# Patient Record
Sex: FEMALE | Race: WHITE | NOT HISPANIC OR LATINO | Employment: UNEMPLOYED | URBAN - METROPOLITAN AREA
[De-identification: names, ages, dates, MRNs, and addresses within clinical notes are randomized per-mention and may not be internally consistent; named-entity substitution may affect disease eponyms.]

---

## 2017-01-30 ENCOUNTER — TRANSCRIBE ORDERS (OUTPATIENT)
Dept: CARDIOLOGY | Facility: CLINIC | Age: 57
End: 2017-01-30

## 2017-01-30 DIAGNOSIS — R94.39 ABNORMAL STRESS TEST: Primary | ICD-10-CM

## 2017-01-31 DIAGNOSIS — R94.39 ABNORMAL STRESS TEST: Primary | ICD-10-CM

## 2017-01-31 RX ORDER — ASPIRIN 325 MG
325 TABLET ORAL ONCE
Status: CANCELLED | OUTPATIENT
Start: 2017-01-31 | End: 2017-01-31

## 2017-01-31 RX ORDER — ONDANSETRON 2 MG/ML
4 INJECTION INTRAMUSCULAR; INTRAVENOUS EVERY 6 HOURS PRN
Status: CANCELLED | OUTPATIENT
Start: 2017-01-31

## 2017-01-31 RX ORDER — ASPIRIN 81 MG/1
325 TABLET ORAL DAILY
Status: CANCELLED | OUTPATIENT
Start: 2017-02-01

## 2017-01-31 RX ORDER — NITROGLYCERIN 0.4 MG/1
0.4 TABLET SUBLINGUAL
Status: CANCELLED | OUTPATIENT
Start: 2017-01-31

## 2017-01-31 RX ORDER — ACETAMINOPHEN 325 MG/1
650 TABLET ORAL EVERY 4 HOURS PRN
Status: CANCELLED | OUTPATIENT
Start: 2017-01-31

## 2017-01-31 RX ORDER — SODIUM CHLORIDE 0.9 % (FLUSH) 0.9 %
1-10 SYRINGE (ML) INJECTION AS NEEDED
Status: CANCELLED | OUTPATIENT
Start: 2017-01-31

## 2017-02-02 ENCOUNTER — HOSPITAL ENCOUNTER (OUTPATIENT)
Facility: HOSPITAL | Age: 57
Setting detail: HOSPITAL OUTPATIENT SURGERY
Discharge: HOME | End: 2017-02-02
Attending: INTERNAL MEDICINE | Admitting: INTERNAL MEDICINE

## 2017-02-02 VITALS
SYSTOLIC BLOOD PRESSURE: 95 MMHG | DIASTOLIC BLOOD PRESSURE: 61 MMHG | OXYGEN SATURATION: 97 % | RESPIRATION RATE: 18 BRPM | WEIGHT: 99.87 LBS | HEIGHT: 65 IN | TEMPERATURE: 98.7 F | BODY MASS INDEX: 16.64 KG/M2 | HEART RATE: 70 BPM

## 2017-02-02 DIAGNOSIS — R94.39 ABNORMAL STRESS TEST: ICD-10-CM

## 2017-02-02 LAB
ALBUMIN SERPL-MCNC: 4.5 G/DL (ref 3.2–4.8)
ALBUMIN/GLOB SERPL: 1.5 G/DL (ref 1.5–2.5)
ALP SERPL-CCNC: 123 U/L (ref 25–100)
ALT SERPL W P-5'-P-CCNC: 26 U/L (ref 7–40)
ANION GAP SERPL CALCULATED.3IONS-SCNC: 5 MMOL/L (ref 3–11)
ARTICHOKE IGE QN: 160 MG/DL (ref 0–130)
AST SERPL-CCNC: 27 U/L (ref 0–33)
BILIRUB SERPL-MCNC: 0.4 MG/DL (ref 0.3–1.2)
BUN BLD-MCNC: 9 MG/DL (ref 9–23)
BUN/CREAT SERPL: 12.9 (ref 7–25)
CALCIUM SPEC-SCNC: 9.7 MG/DL (ref 8.7–10.4)
CHLORIDE SERPL-SCNC: 104 MMOL/L (ref 99–109)
CHOLEST SERPL-MCNC: 233 MG/DL (ref 0–200)
CO2 SERPL-SCNC: 27 MMOL/L (ref 20–31)
CREAT BLD-MCNC: 0.7 MG/DL (ref 0.6–1.3)
DEPRECATED RDW RBC AUTO: 45.2 FL (ref 37–54)
ERYTHROCYTE [DISTWIDTH] IN BLOOD BY AUTOMATED COUNT: 13.8 % (ref 11.3–14.5)
GFR SERPL CREATININE-BSD FRML MDRD: 87 ML/MIN/1.73
GLOBULIN UR ELPH-MCNC: 3.1 GM/DL
GLUCOSE BLD-MCNC: 79 MG/DL (ref 70–100)
HBA1C MFR BLD: 6 % (ref 4.8–5.6)
HCT VFR BLD AUTO: 40.6 % (ref 34.5–44)
HDLC SERPL-MCNC: 50 MG/DL (ref 40–60)
HGB BLD-MCNC: 14 G/DL (ref 11.5–15.5)
MCH RBC QN AUTO: 30.7 PG (ref 27–31)
MCHC RBC AUTO-ENTMCNC: 34.5 G/DL (ref 32–36)
MCV RBC AUTO: 89 FL (ref 80–99)
PLATELET # BLD AUTO: 284 10*3/MM3 (ref 150–450)
PMV BLD AUTO: 9.7 FL (ref 6–12)
POTASSIUM BLD-SCNC: 3.7 MMOL/L (ref 3.5–5.5)
PROT SERPL-MCNC: 7.6 G/DL (ref 5.7–8.2)
RBC # BLD AUTO: 4.56 10*6/MM3 (ref 3.89–5.14)
SODIUM BLD-SCNC: 136 MMOL/L (ref 132–146)
TRIGL SERPL-MCNC: 131 MG/DL (ref 0–150)
WBC NRBC COR # BLD: 7.28 10*3/MM3 (ref 3.5–10.8)

## 2017-02-02 PROCEDURE — 25010000002 MIDAZOLAM PER 1 MG: Performed by: INTERNAL MEDICINE

## 2017-02-02 PROCEDURE — 80061 LIPID PANEL: CPT | Performed by: NURSE PRACTITIONER

## 2017-02-02 PROCEDURE — 93458 L HRT ARTERY/VENTRICLE ANGIO: CPT | Performed by: INTERNAL MEDICINE

## 2017-02-02 PROCEDURE — C1894 INTRO/SHEATH, NON-LASER: HCPCS | Performed by: INTERNAL MEDICINE

## 2017-02-02 PROCEDURE — 0 IOPAMIDOL PER 1 ML: Performed by: INTERNAL MEDICINE

## 2017-02-02 PROCEDURE — 80053 COMPREHEN METABOLIC PANEL: CPT | Performed by: NURSE PRACTITIONER

## 2017-02-02 PROCEDURE — 36415 COLL VENOUS BLD VENIPUNCTURE: CPT

## 2017-02-02 PROCEDURE — 83036 HEMOGLOBIN GLYCOSYLATED A1C: CPT | Performed by: NURSE PRACTITIONER

## 2017-02-02 PROCEDURE — 25010000002 FENTANYL CITRATE (PF) 100 MCG/2ML SOLUTION: Performed by: INTERNAL MEDICINE

## 2017-02-02 PROCEDURE — C1769 GUIDE WIRE: HCPCS | Performed by: INTERNAL MEDICINE

## 2017-02-02 PROCEDURE — 25010000002 HEPARIN (PORCINE) PER 1000 UNITS: Performed by: INTERNAL MEDICINE

## 2017-02-02 PROCEDURE — S0260 H&P FOR SURGERY: HCPCS | Performed by: INTERNAL MEDICINE

## 2017-02-02 PROCEDURE — 85027 COMPLETE CBC AUTOMATED: CPT | Performed by: NURSE PRACTITIONER

## 2017-02-02 RX ORDER — ACETAMINOPHEN 325 MG/1
650 TABLET ORAL EVERY 4 HOURS PRN
Status: DISCONTINUED | OUTPATIENT
Start: 2017-02-02 | End: 2017-02-02 | Stop reason: HOSPADM

## 2017-02-02 RX ORDER — NITROGLYCERIN 0.4 MG/1
0.4 TABLET SUBLINGUAL
Status: DISCONTINUED | OUTPATIENT
Start: 2017-02-02 | End: 2017-02-02 | Stop reason: HOSPADM

## 2017-02-02 RX ORDER — SODIUM CHLORIDE 9 MG/ML
1-3 INJECTION, SOLUTION INTRAVENOUS CONTINUOUS
Status: DISCONTINUED | OUTPATIENT
Start: 2017-02-02 | End: 2017-02-02 | Stop reason: HOSPADM

## 2017-02-02 RX ORDER — LIDOCAINE HYDROCHLORIDE 10 MG/ML
INJECTION, SOLUTION INFILTRATION; PERINEURAL AS NEEDED
Status: DISCONTINUED | OUTPATIENT
Start: 2017-02-02 | End: 2017-02-02 | Stop reason: HOSPADM

## 2017-02-02 RX ORDER — SODIUM CHLORIDE 0.9 % (FLUSH) 0.9 %
1-10 SYRINGE (ML) INJECTION AS NEEDED
Status: DISCONTINUED | OUTPATIENT
Start: 2017-02-02 | End: 2017-02-02 | Stop reason: HOSPADM

## 2017-02-02 RX ORDER — MIDAZOLAM HYDROCHLORIDE 1 MG/ML
INJECTION INTRAMUSCULAR; INTRAVENOUS AS NEEDED
Status: DISCONTINUED | OUTPATIENT
Start: 2017-02-02 | End: 2017-02-02 | Stop reason: HOSPADM

## 2017-02-02 RX ORDER — FENTANYL CITRATE 50 UG/ML
INJECTION, SOLUTION INTRAMUSCULAR; INTRAVENOUS AS NEEDED
Status: DISCONTINUED | OUTPATIENT
Start: 2017-02-02 | End: 2017-02-02 | Stop reason: HOSPADM

## 2017-02-02 RX ORDER — HYDROCODONE BITARTRATE AND ACETAMINOPHEN 5; 325 MG/1; MG/1
1 TABLET ORAL EVERY 4 HOURS PRN
Status: DISCONTINUED | OUTPATIENT
Start: 2017-02-02 | End: 2017-02-02 | Stop reason: HOSPADM

## 2017-02-02 RX ORDER — ASPIRIN 81 MG/1
81 TABLET, CHEWABLE ORAL DAILY
Status: DISCONTINUED | OUTPATIENT
Start: 2017-02-02 | End: 2017-02-02 | Stop reason: HOSPADM

## 2017-02-02 RX ORDER — ONDANSETRON 2 MG/ML
4 INJECTION INTRAMUSCULAR; INTRAVENOUS EVERY 6 HOURS PRN
Status: DISCONTINUED | OUTPATIENT
Start: 2017-02-02 | End: 2017-02-02 | Stop reason: HOSPADM

## 2017-02-02 RX ORDER — ASPIRIN 81 MG/1
81 TABLET ORAL DAILY
COMMUNITY
End: 2019-12-10

## 2017-02-02 RX ORDER — ASPIRIN 325 MG
325 TABLET ORAL ONCE
Status: COMPLETED | OUTPATIENT
Start: 2017-02-02 | End: 2017-02-02

## 2017-02-02 RX ORDER — OMEPRAZOLE 40 MG/1
40 CAPSULE, DELAYED RELEASE ORAL DAILY
COMMUNITY
End: 2021-01-07

## 2017-02-02 RX ORDER — ASPIRIN 325 MG
325 TABLET, DELAYED RELEASE (ENTERIC COATED) ORAL DAILY
Status: DISCONTINUED | OUTPATIENT
Start: 2017-02-03 | End: 2017-02-02 | Stop reason: HOSPADM

## 2017-02-02 RX ADMIN — SODIUM CHLORIDE 3 ML/KG/HR: 9 INJECTION, SOLUTION INTRAVENOUS at 10:24

## 2017-02-02 RX ADMIN — ASPIRIN 325 MG ORAL TABLET 325 MG: 325 PILL ORAL at 10:24

## 2017-02-02 NOTE — H&P
[facility] at [facility]       Referring Provider: Gia Collado MD    Patient Care Team:  Joshua Woodall DO as PCP - General (Internal Medicine)      Past Medical History:  1. Abnormal stress echo  2. GERD  3. Cervical, Ovarian, and Uterine Cancer  A. S/p hysterectomy  4. IBS  5. History of nephrolithiasis    Past Surgical History:  1.  section x 3  2. Hysterectomy x 3  3. Cardiac ablation- suspect SVT (patient doesn't know name of abnormal rhythm) performed in [place]         No Known Allergies        Current Facility-Administered Medications:   •  acetaminophen (TYLENOL) tablet 650 mg, 650 mg, Oral, Q4H PRN, Jeanie Mccormicker APRN  •  aspirin tablet 325 mg, 325 mg, Oral, Once **AND** [START ON 2/3/2017] aspirin EC tablet 325 mg, 325 mg, Oral, Daily, Jeanie Posadas, APRN  •  nitroglycerin (NITROSTAT) SL tablet 0.4 mg, 0.4 mg, Sublingual, Q5 Min PRN, Jeanie Posadas APRN  •  ondansetron (ZOFRAN) injection 4 mg, 4 mg, Intravenous, Q6H PRN, Jeanie Posadas, APRN  •  sodium chloride 0.9 % flush 1-10 mL, 1-10 mL, Intravenous, PRN, Jeanie Posadas APRN         Prescriptions Prior to Admission   Medication Sig Dispense Refill Last Dose   • aspirin 81 MG EC tablet Take 81 mg by mouth Daily.   2017 at 0430   • Linaclotide (LINZESS) 145 MCG capsule Take 1 capsule by mouth Every Morning.   2017 at 0430   • omeprazole (priLOSEC) 40 MG capsule Take 40 mg by mouth Daily.   2017 at 0430         Subjective .   History of present illness:    Patient is a 56-year-old  female with no previous history of coronary disease that we are seeing today for cardiac catheterization secondary to abnormal stress echocardiogram and chest pain shortness of breath.  Patient notes that her symptoms began around December of this past year.  She complains of a midsternal chest tightness which is fairly constant in nature and does not ever go away.  This does seem to be times made worse  "with exertional activity.  She also complains of a sharp chest discomfort that occurs on a daily basis, comes and goes with no specific triggering factors noted.  Over the last couple of months she is also noted some increasing shortness of breath with exertion.  She notes that walking in from the parking garage today she became quite dyspneic.  At times she feels as if she's going to pass out with exertion.  Denies any wheezing.  Does note some occasional coughing.  She had been smoking anywhere from 5-6 cigarettes a day and notes that yesterday was her \"last day\".  She denies any syncope.  Denies any lower extremity edema.  The patient notes she was having symptoms at one point in time and then was taken to [facility] for further evaluation.  There she ruled out for myocardial infarction and was subsequently referred to a cardiologist.  There stress echocardiogram was performed which suggested apical septal, apical and mid inferior hypokinesis.  Subsequently she was referred here for further ischemic evaluation.      Social History     Social History   • Marital status:      Spouse name: N/A   • Number of children: N/A   • Years of education: N/A     Occupational History   • Not on file.     Social History Main Topics   • Smoking status: Light Tobacco Smoker   • Smokeless tobacco: Not on file      Comment: 5-6 cigarettes/day. states last day of smoking was yesterday   • Alcohol use Yes      Comment: social   • Drug use: No   • Sexual activity: Defer     Other Topics Concern   • Not on file     Social History Narrative   • No narrative on file     Family History   Problem Relation Age of Onset   • Coronary artery disease Neg Hx          Review of Systems:Pertinent items are noted in HPI, all other systems reviewed and negative       Objective   Vitals:  Blood pressure 113/79, pulse 81, temperature 98.7 °F (37.1 °C), temperature source Tympanic, resp. rate 18, height 65\" (165.1 cm), weight 99 lb 13.9 oz " (45.3 kg), SpO2 96 %.       Physical Exam   Constitutional: She is oriented to person, place, and time. She appears well-developed and well-nourished. No distress.   HENT:   Mouth/Throat: Oropharynx is clear and moist.   Eyes: Right eye exhibits no discharge. Left eye exhibits no discharge.   Neck: No JVD present. Carotid bruit is not present.   Cardiovascular: Normal rate, regular rhythm, S1 normal, S2 normal, normal heart sounds and intact distal pulses.  Exam reveals no gallop, no S3 and no S4.    No murmur heard.  Pulses:       Carotid pulses are 2+ on the right side, and 2+ on the left side.       Radial pulses are 2+ on the right side, and 2+ on the left side.   Left Micheal's test positive   Pulmonary/Chest: Effort normal and breath sounds normal. No respiratory distress.   Abdominal: Soft. Bowel sounds are normal. There is no tenderness.   Musculoskeletal: She exhibits no edema.   Neurological: She is alert and oriented to person, place, and time.   Skin: Skin is warm and dry.            Results Review:  I reviewed the patient's new clinical results.            Invalid input(s): LABALBU, PROT          No results found for: TROPONINI                Stress echocardiogram 1/26/16:  Apical septal, apical and mid inferior hypokinesis.  Ejection fraction 55-60%.    Assessment/Plan     1. Abnormal stress echocardiogram.  2. Functional class 2-3 angina  3. Tobacco abuse      Plan:    1. We'll proceed to cardiac catheterization plus or minus catheter-based intervention.  Risks, benefits, and alternatives were discussed with the patient who verbalizes understanding and wishes to proceed.  Further recommendations to follow cardiac catheterization.      MARIUSZ Mack obtained past medical, family history, social history, review of systems and functioned as a scribe for the remainder of the dictation for Dr. Natanael MARTIN, Travis Santoyo MD, personally performed the services described in this documentation as scribed  by the above individual in my presence, and it is both accurate and complete      Jeanie Posadas, MARIUSZ  02/02/17  9:51 AM    Please note that portions of this note may have been completed with a voice recognition program. Efforts were made to edit the dictations, but occasionally words are mistranscribed.

## 2019-10-29 ENCOUNTER — OFFICE VISIT (OUTPATIENT)
Dept: ORTHOPEDIC SURGERY | Facility: CLINIC | Age: 59
End: 2019-10-29

## 2019-10-29 VITALS — HEIGHT: 65 IN | BODY MASS INDEX: 17.89 KG/M2 | OXYGEN SATURATION: 99 % | HEART RATE: 81 BPM | WEIGHT: 107.36 LBS

## 2019-10-29 DIAGNOSIS — M25.532 LEFT WRIST PAIN: Primary | ICD-10-CM

## 2019-10-29 DIAGNOSIS — M65.4 DE QUERVAIN'S DISEASE (RADIAL STYLOID TENOSYNOVITIS): ICD-10-CM

## 2019-10-29 DIAGNOSIS — M67.432 GANGLION CYST OF WRIST, LEFT: ICD-10-CM

## 2019-10-29 DIAGNOSIS — M19.032 ARTHRITIS OF LEFT WRIST: ICD-10-CM

## 2019-10-29 PROCEDURE — 99203 OFFICE O/P NEW LOW 30 MIN: CPT | Performed by: PHYSICIAN ASSISTANT

## 2019-10-29 NOTE — PROGRESS NOTES
[facility] Note    Subjective     Patient: Jessica Delgado  : 1960    Primary Care Provider: Joshua Woodall DO    Requesting Provider: As above    Pain of the Left Wrist      History    Chief Complaint: Left wrist pain    History of Present Illness: This is a very pleasant RHD 60 yo female presenting today with one month history of left wrist and forearm pain with a volar cyst.  She denies trauma or injury.  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  She reports radiating pain into the forearm.  She rates it 5/10 and throbbing, aching and shooting.  She denies any mechanical symptoms.  She has had no other treatment and is here for further evaluation and treatment recommendations.    Current Outpatient Medications on File Prior to Visit   Medication Sig Dispense Refill   • Linaclotide (LINZESS) 145 MCG capsule Take 1 capsule by mouth Every Morning.     • aspirin 81 MG EC tablet Take 81 mg by mouth Daily.     • omeprazole (priLOSEC) 40 MG capsule Take 40 mg by mouth Daily.       No current facility-administered medications on file prior to visit.       No Known Allergies   Past Medical History:   Diagnosis Date   • Cervical cancer (CMS/HCC)    • GERD (gastroesophageal reflux disease)    • History of nephrolithiasis    • IBS (irritable bowel syndrome)    • Ovarian cancer (CMS/HCC)    • Uterine cancer (CMS/HCC)      Past Surgical History:   Procedure Laterality Date   • ABLATION OF DYSRHYTHMIC FOCUS     • CARDIAC CATHETERIZATION N/A 2017    Procedure: Left Heart Cath;  Surgeon: Travis Santoyo MD;  Location: [facility] INVASIVE LOCATION;  Service:    •  SECTION      x 3   • CHOLECYSTECTOMY     • HYSTERECTOMY       Family History   Problem Relation Age of Onset   • No Known Problems Mother    • Cancer Father    • Coronary artery disease Neg Hx       Social History     Socioeconomic History   • Marital status:      Spouse name: Not  "on file   • Number of children: Not on file   • Years of education: Not on file   • Highest education level: Not on file   Tobacco Use   • Smoking status: Current Some Day Smoker   • Smokeless tobacco: Never Used   • Tobacco comment: 5-6 cigarettes/day. states last day of smoking was yesterday   Substance and Sexual Activity   • Alcohol use: No     Frequency: Never     Comment: social   • Drug use: No   • Sexual activity: Defer        Review of Systems   Constitutional: Negative.    Eyes: Negative.    Respiratory: Positive for cough.    Cardiovascular: Negative.    Gastrointestinal: Positive for abdominal pain.   Endocrine: Negative.    Genitourinary: Negative.    Musculoskeletal: Positive for joint swelling.   Skin: Negative.    Allergic/Immunologic: Negative.    Neurological: Positive for headaches.   Hematological: Negative.    Psychiatric/Behavioral: Negative.        The following portions of the patient's history were reviewed and updated as appropriate: allergies, current medications, past family history, past medical history, past social history, past surgical history and problem list.      Objective      Physical Exam  Pulse 81   Ht 165.1 cm (65\")   Wt 48.7 kg (107 lb 5.8 oz)   SpO2 99%   Breastfeeding? No   BMI 17.87 kg/m²     Body mass index is 17.87 kg/m².    GENERAL: Body habitus: normal weight for height    Gait: normal     Mental Status:  awake and alert; oriented to person, place, and time    Voice:  clear  SKIN:  Normal    Hair Growth:  Right:normal; Left:  normal  HEENT: Head: Normocephalic, atraumatic,  without obvious abnormality.  eye: normal external eye, no icterus   PULM:  Repiratory effort normal    Ortho Exam  Peripheral Vascular   Bilateral Upper Extremity    No cyanotic nail beds    Pink nail beds and rapid capillary refill   Palpation    Radial Pulse - Bilaterally normal    Neurologic   Sensory: Light touch intact- Left hand       Left Upper Extremity    Left wrist extensors: " 5/5    Left wrist flexors: 5/5    Left intrinsics: 5/5    Musculoskeletal      Left Elbow    Forearm supination: AROM - 90 degrees    Forearm pronation: AROM - 90 degrees     Inspection and Palpation   Left Wrist      Tenderness - tender over the wrist joint, radial styloid and pea sized tender, mobile cyst.  No warmth or erythema    Swelling - none    Crepitus - none    Muscle tone - no atrophy        ROJM:      Left Wrist    Flexion: AROM - 90 degrees    Extension: AROM - 90 degrees     Deformities, Malalignments, Discrepancies    None     Functional Testing   Left Wrist    Tinel's Sign-- negative    Phalen's Sign-- negative    Carpal Compression Test-- negative    Finklestein's Test-- positive         Strength and Tone    Left  strength: good         Hand Exam:          Pain with resisted extension of the thumb    Full range of motion of the thumb MCPJ and IPJ left    Full range of motion of the index finger MCPJ, PIPJ and DIPJ  left    Full range of motion of the middle finger MCPJ, PIPJ and DIPJ left    Full range of motion of the ring finger MCPJ, PIPJ and DIPJ left    Full range of motion of the small finger MCPJ, PIPJ and DIPJ left    FDS, FDP and extensor tendons are intact in the index, middle, ring and small fingers left    FPJ and extensor tendons are intact in the thumb.    No palpable triggering        Medical Decision Making    Data Review:   ordered and reviewed x-rays today    Assessment:  1. Left wrist pain    2. Arthritis of left wrist    3. De Quervain's disease (radial styloid tenosynovitis)        Plan:  1.  Left wrist arthritis with de quervain's and tender ganglion cyst.  I reviewed today's x-rays and clinical findings with the patient.  On exam, she has wrist joint line and radial styloid tenderness with positive finkelstein's and tender cyst on the volar wrist.  X-rays today show moderate to severe radiocarpal degenerative changes.  I told the patient that I think her pain is coming  from a couple of problems.  The cyst is likely coming from the joint and related to the arthritis.  We discussed treatment including aspiration and injection versus observation.  I reassured her that it is nothing worrisome.  I think her wrist and forearm pain may be secondary to both the arthritis and DeQuervains tendosynovitis.  Recommendation today is over the counter nsaids and we will get her a thumb spica splint to use for the tendonitis.  I will see her back in 6 weeks to revisit and consider further treatment if needed.    History, diagnosis and treatment plan discussed with Dr. Carrasco.          Libby Hanson PA-C  10/30/19  1:22 PM

## 2019-12-10 ENCOUNTER — OFFICE VISIT (OUTPATIENT)
Dept: ORTHOPEDIC SURGERY | Facility: CLINIC | Age: 59
End: 2019-12-10

## 2019-12-10 VITALS — OXYGEN SATURATION: 98 % | WEIGHT: 111.4 LBS | BODY MASS INDEX: 18.56 KG/M2 | HEART RATE: 86 BPM | HEIGHT: 65 IN

## 2019-12-10 DIAGNOSIS — M17.0 PRIMARY OSTEOARTHRITIS OF BOTH KNEES: Primary | ICD-10-CM

## 2019-12-10 DIAGNOSIS — M65.4 DE QUERVAIN'S DISEASE (RADIAL STYLOID TENOSYNOVITIS): ICD-10-CM

## 2019-12-10 DIAGNOSIS — M19.032 ARTHRITIS OF LEFT WRIST: ICD-10-CM

## 2019-12-10 DIAGNOSIS — M67.432 GANGLION CYST OF WRIST, LEFT: ICD-10-CM

## 2019-12-10 PROCEDURE — 99213 OFFICE O/P EST LOW 20 MIN: CPT | Performed by: PHYSICIAN ASSISTANT

## 2019-12-10 NOTE — PROGRESS NOTES
[facility]    Subjective     Patient: Jessica Delgado  : 1960    Primary Care Provider: Joshua Woodall DO    Requesting Provider: As above    Follow-up (6 week follow up - Arthritis of left wrist)      History    Chief Complaint: Bilateral knee pain and follow-up left wrist    History of Present Illness: Patient returns today with new bilateral knee pain, left more painful than right.  She reports she has had pain for years intermittently.  It is worse when getting up from a seated position and walking long distances.  She reports anterior and medial knee pain.  She has had no prior treatment.  She denies any radiating pain or mechanical symptoms.  Pain is mild to moderate depending on her activity.  She is also here for follow-up of her left wrist arthritis, painful ganglion and de Quervain's.  She has been splinting at night with significantly improved tendinitis.  She reports the joint itself does not bother her and the ganglion only bothers her with palpation.  She has no other pain with it.    Current Outpatient Medications on File Prior to Visit   Medication Sig Dispense Refill   • Linaclotide (LINZESS) 145 MCG capsule Take 1 capsule by mouth Every Morning.     • omeprazole (priLOSEC) 40 MG capsule Take 40 mg by mouth Daily.     • [DISCONTINUED] aspirin 81 MG EC tablet Take 81 mg by mouth Daily.       No current facility-administered medications on file prior to visit.       No Known Allergies   Past Medical History:   Diagnosis Date   • Cervical cancer (CMS/HCC)    • GERD (gastroesophageal reflux disease)    • History of nephrolithiasis    • IBS (irritable bowel syndrome)    • Ovarian cancer (CMS/HCC)    • Uterine cancer (CMS/HCC)      Past Surgical History:   Procedure Laterality Date   • ABLATION OF DYSRHYTHMIC FOCUS     • CARDIAC CATHETERIZATION N/A 2017    Procedure: Left Heart Cath;  Surgeon: Travis Santoyo MD;  Location: [facility] INVASIVE  "LOCATION;  Service:    •  SECTION      x 3   • CHOLECYSTECTOMY     • HYSTERECTOMY       Family History   Problem Relation Age of Onset   • No Known Problems Mother    • Cancer Father    • Coronary artery disease Neg Hx       Social History     Socioeconomic History   • Marital status:      Spouse name: Not on file   • Number of children: Not on file   • Years of education: Not on file   • Highest education level: Not on file   Tobacco Use   • Smoking status: Current Some Day Smoker   • Smokeless tobacco: Never Used   • Tobacco comment: 5-6 cigarettes/day. states last day of smoking was yesterday   Substance and Sexual Activity   • Alcohol use: No     Frequency: Never     Comment: social   • Drug use: No   • Sexual activity: Defer        Review of Systems   Constitutional: Negative.    HENT: Negative.    Eyes: Negative.    Respiratory: Negative.    Cardiovascular: Negative.    Gastrointestinal: Negative.    Endocrine: Negative.    Genitourinary: Negative.    Musculoskeletal: Positive for joint swelling.   Skin: Negative.    Allergic/Immunologic: Negative.    Neurological: Negative.    Hematological: Negative.    Psychiatric/Behavioral: Negative.        The following portions of the patient's history were reviewed and updated as appropriate: allergies, current medications, past family history, past medical history, past social history, past surgical history and problem list.      Objective      Physical Exam  Pulse 86   Ht 165.1 cm (65\")   Wt 50.5 kg (111 lb 6.4 oz)   SpO2 98%   BMI 18.54 kg/m²     Body mass index is 18.54 kg/m².    Patient is well developed, well nourished and in no acute distress.  Alert and oriented x 3.    Ortho Exam  Left wrist exam:  Nontender over the joint or radial styloid  Tender over a pea-sized volar ganglion  Negative Finkelstein's  Pulses 2+      Right Knee Exam  ----------  ALIGNMENT: Right: neutral----------  RANGE OF MOTION:  Right: Normal (0-120 degrees) with no " extensor lag or flexion contracture  LIGAMENTOUS STABILITY:   Right:stable to varus and valgus stress at terminal extension and 30 degrees without any evidence of laxity----------  STRENGTH:  KNEE FLEXION Right 5/5  KNEE EXTENSION Right 5/5 ----------  PAIN WITH PALPATION: Right medial joint line  PAIN WITH KNEE ROM: Right no  PATELLAR CREPITUS: Right yes   ----------    Left Knee Exam  ----------  Knee Exam:  ----------  ALIGNMENT:  Left: neutral  ----------  RANGE OF MOTION:  Left: Normal (0-120 degrees) with no extensor lag or flexion contracture  LIGAMENTOUS STABILITY:   Left:stable to varus and valgus stress at terminal extension and 30 degrees without any evidence of laxity   ----------  STRENGTH:  KNEE FLEXION  Left 5/5  KNEE EXTENSION Left 5/5  ----------  PAIN WITH PALPATION: Left medial joint line  PAIN WITH KNEE ROM:  Left no  PATELLAR CREPITUS:  Left yes  ----------        Medical Decision Making    Data Review:   ordered and reviewed x-rays today    Assessment:  1. Primary osteoarthritis of both knees    2. Arthritis of left wrist    3. De Quervain's disease (radial styloid tenosynovitis)    4. Ganglion cyst of wrist, left        Plan:  1.  Bilateral knee arthritis.  I reviewed his x-rays and clinical findings with the patient.  X-rays today show mild tricompartmental arthritis bilaterally with no evidence of any acute bony findings.  I think her pain and functional rotations are secondary to knee arthritis.  We discussed treatment options including good shoe wear, ice, NSAIDs, low impact exercise, knee/hip strengthening, weight loss and the 4 fold multiplier on the joint, steroid injection, viscosupplementation and eventually knee replacement.  Patient does not feel her knees are painful enough to consider injection today.  We discussed the use of steroid injection for flare.  I have given her samples of Duexis and if they help her pain, I will give her prescription.  She does have some GI difficulty  with anti-inflammatories.  She will return to see me should she decide she would like injection.    2.  Left de Quervain's, left wrist arthritis and ganglion cyst improved with night splinting.  Patient reports that the de Quervain's has essentially resolved.  I explained that the ganglion cyst given it is volar, would be treated with surgical excision as opposed to aspiration given the location to the radial artery.  She reported is not bothersome enough at this time to consider excision.  She will return to see us as needed.    History, diagnosis and treatment plan discussed with Dr. Carrasco.            Libby Hanson PA-C  12/10/19  2:05 PM

## 2021-01-05 ENCOUNTER — HOSPITAL ENCOUNTER (EMERGENCY)
Facility: HOSPITAL | Age: 61
Discharge: LEFT BEFORE DISCHARGE | End: 2021-01-06
Attending: EMERGENCY MEDICINE

## 2021-01-05 ENCOUNTER — APPOINTMENT (OUTPATIENT)
Dept: GENERAL RADIOLOGY | Facility: HOSPITAL | Age: 61
End: 2021-01-05

## 2021-01-05 VITALS
DIASTOLIC BLOOD PRESSURE: 84 MMHG | HEIGHT: 65 IN | HEART RATE: 114 BPM | RESPIRATION RATE: 24 BRPM | WEIGHT: 114 LBS | OXYGEN SATURATION: 96 % | SYSTOLIC BLOOD PRESSURE: 126 MMHG | TEMPERATURE: 95.3 F | BODY MASS INDEX: 18.99 KG/M2

## 2021-01-05 LAB
ALBUMIN SERPL-MCNC: 4.3 G/DL (ref 3.5–5.2)
ALBUMIN/GLOB SERPL: 1.1 G/DL
ALP SERPL-CCNC: 188 U/L (ref 39–117)
ALT SERPL W P-5'-P-CCNC: 18 U/L (ref 1–33)
ANION GAP SERPL CALCULATED.3IONS-SCNC: 14 MMOL/L (ref 5–15)
AST SERPL-CCNC: 33 U/L (ref 1–32)
BASOPHILS # BLD AUTO: 0.06 10*3/MM3 (ref 0–0.2)
BASOPHILS NFR BLD AUTO: 0.6 % (ref 0–1.5)
BILIRUB SERPL-MCNC: 0.3 MG/DL (ref 0–1.2)
BUN SERPL-MCNC: 6 MG/DL (ref 8–23)
BUN/CREAT SERPL: 9.4 (ref 7–25)
CALCIUM SPEC-SCNC: 9.6 MG/DL (ref 8.6–10.5)
CHLORIDE SERPL-SCNC: 96 MMOL/L (ref 98–107)
CO2 SERPL-SCNC: 25 MMOL/L (ref 22–29)
CREAT SERPL-MCNC: 0.64 MG/DL (ref 0.57–1)
DEPRECATED RDW RBC AUTO: 42.9 FL (ref 37–54)
EOSINOPHIL # BLD AUTO: 0.2 10*3/MM3 (ref 0–0.4)
EOSINOPHIL NFR BLD AUTO: 2 % (ref 0.3–6.2)
ERYTHROCYTE [DISTWIDTH] IN BLOOD BY AUTOMATED COUNT: 13.2 % (ref 12.3–15.4)
GFR SERPL CREATININE-BSD FRML MDRD: 95 ML/MIN/1.73
GLOBULIN UR ELPH-MCNC: 3.9 GM/DL
GLUCOSE SERPL-MCNC: 115 MG/DL (ref 65–99)
HCT VFR BLD AUTO: 42.3 % (ref 34–46.6)
HGB BLD-MCNC: 13.9 G/DL (ref 12–15.9)
HOLD SPECIMEN: NORMAL
HOLD SPECIMEN: NORMAL
IMM GRANULOCYTES # BLD AUTO: 0.03 10*3/MM3 (ref 0–0.05)
IMM GRANULOCYTES NFR BLD AUTO: 0.3 % (ref 0–0.5)
LYMPHOCYTES # BLD AUTO: 1.71 10*3/MM3 (ref 0.7–3.1)
LYMPHOCYTES NFR BLD AUTO: 17 % (ref 19.6–45.3)
MAGNESIUM SERPL-MCNC: 2.3 MG/DL (ref 1.6–2.4)
MCH RBC QN AUTO: 28.8 PG (ref 26.6–33)
MCHC RBC AUTO-ENTMCNC: 32.9 G/DL (ref 31.5–35.7)
MCV RBC AUTO: 87.8 FL (ref 79–97)
MONOCYTES # BLD AUTO: 0.67 10*3/MM3 (ref 0.1–0.9)
MONOCYTES NFR BLD AUTO: 6.7 % (ref 5–12)
NEUTROPHILS NFR BLD AUTO: 7.4 10*3/MM3 (ref 1.7–7)
NEUTROPHILS NFR BLD AUTO: 73.4 % (ref 42.7–76)
NRBC BLD AUTO-RTO: 0 /100 WBC (ref 0–0.2)
NT-PROBNP SERPL-MCNC: 86.4 PG/ML (ref 0–900)
PLATELET # BLD AUTO: 377 10*3/MM3 (ref 140–450)
PMV BLD AUTO: 10.3 FL (ref 6–12)
POTASSIUM SERPL-SCNC: 3.8 MMOL/L (ref 3.5–5.2)
PROT SERPL-MCNC: 8.2 G/DL (ref 6–8.5)
RBC # BLD AUTO: 4.82 10*6/MM3 (ref 3.77–5.28)
SODIUM SERPL-SCNC: 135 MMOL/L (ref 136–145)
TROPONIN T SERPL-MCNC: <0.01 NG/ML (ref 0–0.03)
TSH SERPL DL<=0.05 MIU/L-ACNC: 1.61 UIU/ML (ref 0.27–4.2)
WBC # BLD AUTO: 10.07 10*3/MM3 (ref 3.4–10.8)
WHOLE BLOOD HOLD SPECIMEN: NORMAL
WHOLE BLOOD HOLD SPECIMEN: NORMAL

## 2021-01-05 PROCEDURE — 83880 ASSAY OF NATRIURETIC PEPTIDE: CPT

## 2021-01-05 PROCEDURE — 93005 ELECTROCARDIOGRAM TRACING: CPT

## 2021-01-05 PROCEDURE — 99211 OFF/OP EST MAY X REQ PHY/QHP: CPT | Performed by: EMERGENCY MEDICINE

## 2021-01-05 PROCEDURE — 80053 COMPREHEN METABOLIC PANEL: CPT

## 2021-01-05 PROCEDURE — 83735 ASSAY OF MAGNESIUM: CPT

## 2021-01-05 PROCEDURE — 93005 ELECTROCARDIOGRAM TRACING: CPT | Performed by: EMERGENCY MEDICINE

## 2021-01-05 PROCEDURE — 85025 COMPLETE CBC W/AUTO DIFF WBC: CPT

## 2021-01-05 PROCEDURE — 71045 X-RAY EXAM CHEST 1 VIEW: CPT

## 2021-01-05 PROCEDURE — 84484 ASSAY OF TROPONIN QUANT: CPT

## 2021-01-05 PROCEDURE — 84443 ASSAY THYROID STIM HORMONE: CPT

## 2021-01-05 RX ORDER — SODIUM CHLORIDE 0.9 % (FLUSH) 0.9 %
10 SYRINGE (ML) INJECTION AS NEEDED
Status: DISCONTINUED | OUTPATIENT
Start: 2021-01-05 | End: 2021-01-06 | Stop reason: HOSPADM

## 2021-01-06 ENCOUNTER — TELEPHONE (OUTPATIENT)
Dept: EMERGENCY DEPT | Facility: HOSPITAL | Age: 61
End: 2021-01-06

## 2021-01-06 NOTE — TELEPHONE ENCOUNTER
Had a very long conversation with the patient I discussed her x-ray results and I recommended getting a CAT scan.  She tells me she was recently in [facility] and she describes a negative viral panel including Covid and the flu among other viruses.  She tells me she is a smoker she has COPD and still has a cough.  I advised her if she has worsening symptoms difficulty breathing etc. she will need to return to emergency department for further evaluation however if her symptoms are improving then recommend a CAT scan within the next week to rule out other causes including lung nodule which she already knew about however it is getting worse apparently on the x-ray as well as things like cancer or developing pneumonia.  Patient was very thankful for my call in my time.

## 2021-01-07 ENCOUNTER — APPOINTMENT (OUTPATIENT)
Dept: GENERAL RADIOLOGY | Facility: HOSPITAL | Age: 61
End: 2021-01-07

## 2021-01-07 ENCOUNTER — APPOINTMENT (OUTPATIENT)
Dept: CT IMAGING | Facility: HOSPITAL | Age: 61
End: 2021-01-07

## 2021-01-07 ENCOUNTER — HOSPITAL ENCOUNTER (EMERGENCY)
Facility: HOSPITAL | Age: 61
Discharge: HOME | End: 2021-01-07
Attending: EMERGENCY MEDICINE | Admitting: EMERGENCY MEDICINE

## 2021-01-07 VITALS
WEIGHT: 114 LBS | HEART RATE: 91 BPM | OXYGEN SATURATION: 97 % | BODY MASS INDEX: 18.99 KG/M2 | RESPIRATION RATE: 18 BRPM | TEMPERATURE: 97.3 F | HEIGHT: 65 IN | SYSTOLIC BLOOD PRESSURE: 87 MMHG | DIASTOLIC BLOOD PRESSURE: 61 MMHG

## 2021-01-07 DIAGNOSIS — R91.1 PULMONARY NODULE: ICD-10-CM

## 2021-01-07 DIAGNOSIS — Z72.0 TOBACCO ABUSE: ICD-10-CM

## 2021-01-07 DIAGNOSIS — R06.02 SHORTNESS OF BREATH: ICD-10-CM

## 2021-01-07 DIAGNOSIS — J44.1 COPD EXACERBATION (HCC): ICD-10-CM

## 2021-01-07 DIAGNOSIS — J18.9 PNEUMONIA OF LEFT UPPER LOBE DUE TO INFECTIOUS ORGANISM: Primary | ICD-10-CM

## 2021-01-07 DIAGNOSIS — J43.9 PULMONARY EMPHYSEMA, UNSPECIFIED EMPHYSEMA TYPE (HCC): ICD-10-CM

## 2021-01-07 LAB
ALBUMIN SERPL-MCNC: 3.8 G/DL (ref 3.5–5.2)
ALBUMIN/GLOB SERPL: 1 G/DL
ALP SERPL-CCNC: 197 U/L (ref 39–117)
ALT SERPL W P-5'-P-CCNC: 18 U/L (ref 1–33)
ANION GAP SERPL CALCULATED.3IONS-SCNC: 11 MMOL/L (ref 5–15)
AST SERPL-CCNC: 28 U/L (ref 1–32)
BASOPHILS # BLD AUTO: 0.08 10*3/MM3 (ref 0–0.2)
BASOPHILS NFR BLD AUTO: 1.1 % (ref 0–1.5)
BILIRUB SERPL-MCNC: 0.2 MG/DL (ref 0–1.2)
BUN SERPL-MCNC: 6 MG/DL (ref 8–23)
BUN/CREAT SERPL: 10.5 (ref 7–25)
CALCIUM SPEC-SCNC: 9.1 MG/DL (ref 8.6–10.5)
CHLORIDE SERPL-SCNC: 98 MMOL/L (ref 98–107)
CO2 SERPL-SCNC: 26 MMOL/L (ref 22–29)
CREAT SERPL-MCNC: 0.57 MG/DL (ref 0.57–1)
DEPRECATED RDW RBC AUTO: 43.4 FL (ref 37–54)
EOSINOPHIL # BLD AUTO: 0.23 10*3/MM3 (ref 0–0.4)
EOSINOPHIL NFR BLD AUTO: 3.2 % (ref 0.3–6.2)
ERYTHROCYTE [DISTWIDTH] IN BLOOD BY AUTOMATED COUNT: 13.1 % (ref 12.3–15.4)
FLUAV RNA RESP QL NAA+PROBE: NOT DETECTED
FLUBV RNA RESP QL NAA+PROBE: NOT DETECTED
GFR SERPL CREATININE-BSD FRML MDRD: 108 ML/MIN/1.73
GLOBULIN UR ELPH-MCNC: 3.7 GM/DL
GLUCOSE SERPL-MCNC: 90 MG/DL (ref 65–99)
HCT VFR BLD AUTO: 39.6 % (ref 34–46.6)
HGB BLD-MCNC: 13.1 G/DL (ref 12–15.9)
HOLD SPECIMEN: NORMAL
HOLD SPECIMEN: NORMAL
IMM GRANULOCYTES # BLD AUTO: 0.02 10*3/MM3 (ref 0–0.05)
IMM GRANULOCYTES NFR BLD AUTO: 0.3 % (ref 0–0.5)
LYMPHOCYTES # BLD AUTO: 2.03 10*3/MM3 (ref 0.7–3.1)
LYMPHOCYTES NFR BLD AUTO: 28.2 % (ref 19.6–45.3)
MAGNESIUM SERPL-MCNC: 2.2 MG/DL (ref 1.6–2.4)
MCH RBC QN AUTO: 29.7 PG (ref 26.6–33)
MCHC RBC AUTO-ENTMCNC: 33.1 G/DL (ref 31.5–35.7)
MCV RBC AUTO: 89.8 FL (ref 79–97)
MONOCYTES # BLD AUTO: 0.77 10*3/MM3 (ref 0.1–0.9)
MONOCYTES NFR BLD AUTO: 10.7 % (ref 5–12)
NEUTROPHILS NFR BLD AUTO: 4.08 10*3/MM3 (ref 1.7–7)
NEUTROPHILS NFR BLD AUTO: 56.5 % (ref 42.7–76)
NRBC BLD AUTO-RTO: 0 /100 WBC (ref 0–0.2)
NT-PROBNP SERPL-MCNC: 65.5 PG/ML (ref 0–900)
PLATELET # BLD AUTO: 381 10*3/MM3 (ref 140–450)
PMV BLD AUTO: 10.4 FL (ref 6–12)
POTASSIUM SERPL-SCNC: 3.8 MMOL/L (ref 3.5–5.2)
PROT SERPL-MCNC: 7.5 G/DL (ref 6–8.5)
QT INTERVAL: 360 MS
QTC INTERVAL: 435 MS
RBC # BLD AUTO: 4.41 10*6/MM3 (ref 3.77–5.28)
SARS-COV-2 RNA RESP QL NAA+PROBE: NOT DETECTED
SODIUM SERPL-SCNC: 135 MMOL/L (ref 136–145)
TROPONIN T SERPL-MCNC: <0.01 NG/ML (ref 0–0.03)
WBC # BLD AUTO: 7.21 10*3/MM3 (ref 3.4–10.8)
WHOLE BLOOD HOLD SPECIMEN: NORMAL
WHOLE BLOOD HOLD SPECIMEN: NORMAL

## 2021-01-07 PROCEDURE — 25010000002 METHYLPREDNISOLONE PER 125 MG: Performed by: EMERGENCY MEDICINE

## 2021-01-07 PROCEDURE — 83735 ASSAY OF MAGNESIUM: CPT | Performed by: EMERGENCY MEDICINE

## 2021-01-07 PROCEDURE — 84484 ASSAY OF TROPONIN QUANT: CPT | Performed by: EMERGENCY MEDICINE

## 2021-01-07 PROCEDURE — 83880 ASSAY OF NATRIURETIC PEPTIDE: CPT | Performed by: EMERGENCY MEDICINE

## 2021-01-07 PROCEDURE — 96365 THER/PROPH/DIAG IV INF INIT: CPT

## 2021-01-07 PROCEDURE — 93005 ELECTROCARDIOGRAM TRACING: CPT

## 2021-01-07 PROCEDURE — 80053 COMPREHEN METABOLIC PANEL: CPT | Performed by: EMERGENCY MEDICINE

## 2021-01-07 PROCEDURE — 85025 COMPLETE CBC W/AUTO DIFF WBC: CPT | Performed by: EMERGENCY MEDICINE

## 2021-01-07 PROCEDURE — 94640 AIRWAY INHALATION TREATMENT: CPT

## 2021-01-07 PROCEDURE — 96375 TX/PRO/DX INJ NEW DRUG ADDON: CPT

## 2021-01-07 PROCEDURE — 87636 SARSCOV2 & INF A&B AMP PRB: CPT | Performed by: EMERGENCY MEDICINE

## 2021-01-07 PROCEDURE — 99285 EMERGENCY DEPT VISIT HI MDM: CPT

## 2021-01-07 PROCEDURE — 25010000002 CEFTRIAXONE PER 250 MG: Performed by: EMERGENCY MEDICINE

## 2021-01-07 PROCEDURE — 93005 ELECTROCARDIOGRAM TRACING: CPT | Performed by: EMERGENCY MEDICINE

## 2021-01-07 PROCEDURE — 94799 UNLISTED PULMONARY SVC/PX: CPT

## 2021-01-07 PROCEDURE — 0 IOPAMIDOL PER 1 ML: Performed by: EMERGENCY MEDICINE

## 2021-01-07 PROCEDURE — 71275 CT ANGIOGRAPHY CHEST: CPT

## 2021-01-07 RX ORDER — SODIUM CHLORIDE 0.9 % (FLUSH) 0.9 %
10 SYRINGE (ML) INJECTION AS NEEDED
Status: DISCONTINUED | OUTPATIENT
Start: 2021-01-07 | End: 2021-01-08 | Stop reason: HOSPADM

## 2021-01-07 RX ORDER — METHYLPREDNISOLONE SODIUM SUCCINATE 125 MG/2ML
125 INJECTION, POWDER, LYOPHILIZED, FOR SOLUTION INTRAMUSCULAR; INTRAVENOUS ONCE
Status: COMPLETED | OUTPATIENT
Start: 2021-01-07 | End: 2021-01-07

## 2021-01-07 RX ORDER — NICOTINE 21 MG/24HR
1 PATCH, TRANSDERMAL 24 HOURS TRANSDERMAL ONCE
Status: DISCONTINUED | OUTPATIENT
Start: 2021-01-07 | End: 2021-01-08 | Stop reason: HOSPADM

## 2021-01-07 RX ORDER — ALBUTEROL SULFATE 90 UG/1
2 AEROSOL, METERED RESPIRATORY (INHALATION) EVERY 4 HOURS PRN
Qty: 1 G | Refills: 0 | Status: SHIPPED | OUTPATIENT
Start: 2021-01-07

## 2021-01-07 RX ORDER — AZITHROMYCIN 250 MG/1
TABLET, FILM COATED ORAL
Qty: 6 TABLET | Refills: 0 | Status: SHIPPED | OUTPATIENT
Start: 2021-01-07 | End: 2021-01-12

## 2021-01-07 RX ORDER — AMOXICILLIN AND CLAVULANATE POTASSIUM 875; 125 MG/1; MG/1
1 TABLET, FILM COATED ORAL 2 TIMES DAILY
Qty: 20 TABLET | Refills: 0 | Status: SHIPPED | OUTPATIENT
Start: 2021-01-07 | End: 2021-01-12

## 2021-01-07 RX ORDER — METHYLPREDNISOLONE 4 MG/1
TABLET ORAL
Qty: 21 TABLET | Refills: 0 | Status: SHIPPED | OUTPATIENT
Start: 2021-01-07 | End: 2021-01-12

## 2021-01-07 RX ORDER — IPRATROPIUM BROMIDE AND ALBUTEROL SULFATE 2.5; .5 MG/3ML; MG/3ML
3 SOLUTION RESPIRATORY (INHALATION) ONCE
Status: COMPLETED | OUTPATIENT
Start: 2021-01-07 | End: 2021-01-07

## 2021-01-07 RX ORDER — ALBUTEROL SULFATE 90 UG/1
6 AEROSOL, METERED RESPIRATORY (INHALATION) ONCE
Status: COMPLETED | OUTPATIENT
Start: 2021-01-07 | End: 2021-01-07

## 2021-01-07 RX ORDER — AZITHROMYCIN 250 MG/1
500 TABLET, FILM COATED ORAL ONCE
Status: COMPLETED | OUTPATIENT
Start: 2021-01-07 | End: 2021-01-07

## 2021-01-07 RX ORDER — NICOTINE 21 MG/24HR
1 PATCH, TRANSDERMAL 24 HOURS TRANSDERMAL EVERY 24 HOURS
Qty: 28 PATCH | Refills: 0 | OUTPATIENT
Start: 2021-01-07 | End: 2021-10-01

## 2021-01-07 RX ADMIN — SODIUM CHLORIDE 500 ML: 9 INJECTION, SOLUTION INTRAVENOUS at 18:15

## 2021-01-07 RX ADMIN — AZITHROMYCIN MONOHYDRATE 500 MG: 250 TABLET ORAL at 21:16

## 2021-01-07 RX ADMIN — ALBUTEROL SULFATE 6 PUFF: 90 AEROSOL, METERED RESPIRATORY (INHALATION) at 18:05

## 2021-01-07 RX ADMIN — Medication 1 PATCH: at 21:16

## 2021-01-07 RX ADMIN — SODIUM CHLORIDE 2 G: 900 INJECTION INTRAVENOUS at 21:16

## 2021-01-07 RX ADMIN — IOPAMIDOL 75 ML: 755 INJECTION, SOLUTION INTRAVENOUS at 19:38

## 2021-01-07 RX ADMIN — METHYLPREDNISOLONE SODIUM SUCCINATE 125 MG: 125 INJECTION, POWDER, FOR SOLUTION INTRAMUSCULAR; INTRAVENOUS at 18:10

## 2021-01-07 RX ADMIN — IPRATROPIUM BROMIDE AND ALBUTEROL SULFATE 3 ML: 2.5; .5 SOLUTION RESPIRATORY (INHALATION) at 20:11

## 2021-01-07 NOTE — ED PROVIDER NOTES
Subjective   Jessica Delgado is a 60 y.o. female who presents to the ED with c/o shortness of breath. The patient has been experiencing shortness of breath with palpitations for the past 8 days. Her symptoms are improved with resting, but when she exerts herself they are exacerbated with onset of lightheadedness. She has been feeling dehydrated recently, for which she has been consuming ample amounts of Pedialyte and water, but her symptoms have persisted. The patient came to the ED 2 days ago for her symptoms and had a chest x-ray performed but left due to the long wait. She was contacted today by an ED provider and encouraged to return to the ED for a CT of her chest secondary to spots showing up on her left lung in the chest x-ray. The patient complains of cough, fever as high as 102, that have since resolved, headache, chills, and myalgias but denies abnormal urinary symptoms, hematochezia, melena, hematuria, nausea, vomiting, and diarrhea.  She has had no chest or abdominal pain.  She reports testing negative for COVID-19 2 days ago.  She has had 3 total COVID-19 test in the last week.  The patient currently works in a . She is a smoker. The patient reports a history of COPD. There are no other acute complaints at this time.      History provided by:  Patient and medical records  Shortness of Breath  Severity:  Moderate  Onset quality:  Sudden  Duration:  8 days  Timing:  Constant  Progression:  Worsening  Chronicity:  New  Context: activity    Relieved by:  Rest  Worsened by:  Exertion and activity  Ineffective treatments:  Position changes and sitting up  Associated symptoms: cough, fever and headaches    Associated symptoms: no abdominal pain, no chest pain, no sore throat and no vomiting    Risk factors: alcohol use, hx of cancer and tobacco use    Risk factors: no hx of PE/DVT        Review of Systems   Constitutional: Positive for chills and fever.   HENT: Negative for sore throat.    Eyes:  Negative.    Respiratory: Positive for cough and shortness of breath.    Cardiovascular: Positive for palpitations. Negative for chest pain.   Gastrointestinal: Negative.  Negative for abdominal pain, blood in stool, diarrhea, nausea and vomiting.        She denies melena.   Genitourinary: Negative.  Negative for decreased urine volume, difficulty urinating, dysuria, frequency, hematuria and urgency.   Musculoskeletal: Positive for myalgias.   Skin: Negative.    Neurological: Positive for dizziness, light-headedness and headaches. Negative for syncope.   All other systems reviewed and are negative.      Past Medical History:   Diagnosis Date   • Cervical cancer (CMS/HCC)    • COPD (chronic obstructive pulmonary disease) (CMS/HCC)    • GERD (gastroesophageal reflux disease)    • History of nephrolithiasis    • IBS (irritable bowel syndrome)    • Ovarian cancer (CMS/HCC)    • Uterine cancer (CMS/HCC)        No Known Allergies    Past Surgical History:   Procedure Laterality Date   • ABLATION OF DYSRHYTHMIC FOCUS     • CARDIAC CATHETERIZATION N/A 2017    Procedure: Left Heart Cath;  Surgeon: Travis Santoyo MD;  Location: [facility] CATH INVASIVE LOCATION;  Service:    •  SECTION      x 3   • CHOLECYSTECTOMY     • HYSTERECTOMY         Family History   Problem Relation Age of Onset   • No Known Problems Mother    • Cancer Father    • Coronary artery disease Neg Hx        Social History     Socioeconomic History   • Marital status:      Spouse name: Not on file   • Number of children: Not on file   • Years of education: Not on file   • Highest education level: Not on file   Tobacco Use   • Smoking status: Current Some Day Smoker   • Smokeless tobacco: Never Used   • Tobacco comment: 5-6 cigarettes/day. states last day of smoking was yesterday   Substance and Sexual Activity   • Alcohol use: No     Frequency: Never     Comment: social   • Drug use: No   • Sexual activity: Defer         Objective   Physical  Exam  Vitals signs and nursing note reviewed.   Constitutional:       General: She is not in acute distress.     Appearance: She is well-developed.   HENT:      Head: Normocephalic and atraumatic.   Eyes:      General: No scleral icterus.     Conjunctiva/sclera: Conjunctivae normal.   Neck:      Musculoskeletal: Normal range of motion and neck supple.   Cardiovascular:      Rate and Rhythm: Normal rate and regular rhythm.      Heart sounds: Normal heart sounds. No murmur.      Comments: Heart rate of 95.  Pulmonary:      Effort: Pulmonary effort is normal. No respiratory distress.      Breath sounds: Decreased breath sounds present.      Comments: Diminished breath sounds. Prolonged expiratory phase.  Dry cough on examination.  Abdominal:      General: Bowel sounds are normal.      Palpations: Abdomen is soft.      Tenderness: There is no abdominal tenderness.   Musculoskeletal: Normal range of motion.         General: No tenderness.      Right lower leg: No edema.      Left lower leg: No edema.      Comments: No clubbing, cyanosis, or edema. No stigmata of DVT.   Lymphadenopathy:      Cervical: No cervical adenopathy.   Skin:     General: Skin is warm and dry.      Findings: No erythema.      Nails: There is no clubbing.     Neurological:      Mental Status: She is alert and oriented to person, place, and time.   Psychiatric:         Behavior: Behavior normal.         Procedures         ED Course  ED Course as of Jan 07 2117   Thu Jan 07, 2021 2043 I discussed evaluation with patient.  She reports this will be her fourth Covid test.  A Covid test was done again today and is negative.  Flu a and B are both negative.    Records reviewed and left heart catheterization from 2017 with Dr. Penn revealed     IMPRESSION:  1.  Normal coronary arteries  2.  Normal ventricular function  3.  Normal hemodynamics      []   2052 Patient felt improved after the first inhaler treatment.  She is followed up with a DuoNeb.  She  felt significantly improved and much better.  She had no tachypnea no distress and slept comfortably in the ED.  She was awake for her last reevaluation now.  Sat is 96 and patient remains nontoxic.White count is unremarkable.  Troponin is less than 0.1Patient is treated with IV methylprednisolone.I discussed the CTA at length.  She does have a 0.4 cm pulmonary nodule which will refer her to pulmonary nodule clinic only because she is a smoker though she is trying to quit.  She has a small area of pneumonia and an additional area of bronchitis.  I discussed smoking cessation and treatment with antibiotics.  We will treat her with Zithromax and ceftriaxone here in the ED.  We will plan on discharge with Zithromax and Augmentin.I will have her follow-up with her PCP Dr. Domínguez early next week.  She can choose to extend the antibiotics if neededI discussed nodule clinic follow-up and follow-up with her PCP.  We discussed indications for immediate return including worsening shortness of breath, fever, or or any acute urgent emergent or significant symptoms.  We discussed smoking cessation at length and treatment with Leonides dee and responsible patient verbalizes understanding agreement of the plan of care, need for follow-up, and indications for immediate return.    [HH]      ED Course User Index  [HH] Kirt Santacruz MD     Recent Results (from the past 24 hour(s))   ECG 12 Lead    Collection Time: 01/07/21  4:32 PM   Result Value Ref Range    QT Interval 360 ms    QTC Interval 435 ms   Comprehensive Metabolic Panel    Collection Time: 01/07/21  4:35 PM    Specimen: Blood   Result Value Ref Range    Glucose 90 65 - 99 mg/dL    BUN 6 (L) 8 - 23 mg/dL    Creatinine 0.57 0.57 - 1.00 mg/dL    Sodium 135 (L) 136 - 145 mmol/L    Potassium 3.8 3.5 - 5.2 mmol/L    Chloride 98 98 - 107 mmol/L    CO2 26.0 22.0 - 29.0 mmol/L    Calcium 9.1 8.6 - 10.5 mg/dL    Total Protein 7.5 6.0 - 8.5 g/dL    Albumin 3.80 3.50 - 5.20  g/dL    ALT (SGPT) 18 1 - 33 U/L    AST (SGOT) 28 1 - 32 U/L    Alkaline Phosphatase 197 (H) 39 - 117 U/L    Total Bilirubin 0.2 0.0 - 1.2 mg/dL    eGFR Non African Amer 108 >60 mL/min/1.73    Globulin 3.7 gm/dL    A/G Ratio 1.0 g/dL    BUN/Creatinine Ratio 10.5 7.0 - 25.0    Anion Gap 11.0 5.0 - 15.0 mmol/L   BNP    Collection Time: 01/07/21  4:35 PM    Specimen: Blood   Result Value Ref Range    proBNP 65.5 0.0 - 900.0 pg/mL   Troponin    Collection Time: 01/07/21  4:35 PM    Specimen: Blood   Result Value Ref Range    Troponin T <0.010 0.000 - 0.030 ng/mL   Light Blue Top    Collection Time: 01/07/21  4:35 PM   Result Value Ref Range    Extra Tube hold for add-on    Green Top (Gel)    Collection Time: 01/07/21  4:35 PM   Result Value Ref Range    Extra Tube Hold for add-ons.    Lavender Top    Collection Time: 01/07/21  4:35 PM   Result Value Ref Range    Extra Tube hold for add-on    Gold Top - SST    Collection Time: 01/07/21  4:35 PM   Result Value Ref Range    Extra Tube Hold for add-ons.    CBC Auto Differential    Collection Time: 01/07/21  4:35 PM    Specimen: Blood   Result Value Ref Range    WBC 7.21 3.40 - 10.80 10*3/mm3    RBC 4.41 3.77 - 5.28 10*6/mm3    Hemoglobin 13.1 12.0 - 15.9 g/dL    Hematocrit 39.6 34.0 - 46.6 %    MCV 89.8 79.0 - 97.0 fL    MCH 29.7 26.6 - 33.0 pg    MCHC 33.1 31.5 - 35.7 g/dL    RDW 13.1 12.3 - 15.4 %    RDW-SD 43.4 37.0 - 54.0 fl    MPV 10.4 6.0 - 12.0 fL    Platelets 381 140 - 450 10*3/mm3    Neutrophil % 56.5 42.7 - 76.0 %    Lymphocyte % 28.2 19.6 - 45.3 %    Monocyte % 10.7 5.0 - 12.0 %    Eosinophil % 3.2 0.3 - 6.2 %    Basophil % 1.1 0.0 - 1.5 %    Immature Grans % 0.3 0.0 - 0.5 %    Neutrophils, Absolute 4.08 1.70 - 7.00 10*3/mm3    Lymphocytes, Absolute 2.03 0.70 - 3.10 10*3/mm3    Monocytes, Absolute 0.77 0.10 - 0.90 10*3/mm3    Eosinophils, Absolute 0.23 0.00 - 0.40 10*3/mm3    Basophils, Absolute 0.08 0.00 - 0.20 10*3/mm3    Immature Grans, Absolute 0.02 0.00 -  0.05 10*3/mm3    nRBC 0.0 0.0 - 0.2 /100 WBC   Magnesium    Collection Time: 01/07/21  4:35 PM    Specimen: Blood   Result Value Ref Range    Magnesium 2.2 1.6 - 2.4 mg/dL   COVID-19 and FLU A/B PCR - Swab, Nasopharynx    Collection Time: 01/07/21  6:13 PM    Specimen: Nasopharynx; Swab   Result Value Ref Range    COVID19 Not Detected Not Detected - Ref. Range    Influenza A PCR Not Detected Not Detected    Influenza B PCR Not Detected Not Detected     Note: In addition to lab results from this visit, the labs listed above may include labs taken at another facility or during a different encounter within the last 24 hours. Please correlate lab times with ED admission and discharge times for further clarification of the services performed during this visit.    CT Angiogram Chest   Final Result      1. No aortic aneurysm or dissection.   2. No PE.   3. The lungs are emphysematous and there are probable inflammatory/infectious infiltrates in both lungs as described. The largest in the posterior left upper lobe measures up to 2.2 cm and appears to correspond to the plain film finding. Follow-up to   clearing recommended after appropriate therapy. There is also evidence of bronchitis in the left lower lobe.   4. 4 mm noncalcified subpleural nodule in the left lower lobe. Fleischner recommendations follow.      Management recommendation: Based on current published guidelines, uncomplicated pulmonary nodules less than 6 mm do not require CT follow-up. In high risk patients, follow-up CT in 12 months is optional. (Fleischner Society guidelines, 2017).      Imaging features can be seen with COVID-19 pneumonia, although are nonspecific and can can occur with a variety of infectious and noninfectious processes.      Signer Name: Lambert Florence MD    Signed: 1/7/2021 7:57 PM    Workstation Name: [email or web address]     [facility]        Vitals:    01/07/21 1900 01/07/21 2000 01/07/21 2011 01/07/21 2030   BP:  106/72 108/70  (!) 87/61   BP Location:       Pulse: 90 96 84 106   Resp:   18    Temp:       SpO2: 100% 95% 96% 96%   Weight:       Height:         Medications   sodium chloride 0.9 % flush 10 mL (has no administration in time range)   sodium chloride 0.9 % flush 10 mL (has no administration in time range)   cefTRIAXone (ROCEPHIN) 2 g/100 mL 0.9% NS IVPB (MBP) (has no administration in time range)   nicotine (NICODERM CQ) 21 MG/24HR patch 1 patch (has no administration in time range)   azithromycin (ZITHROMAX) tablet 500 mg (has no administration in time range)   albuterol sulfate HFA (PROVENTIL HFA;VENTOLIN HFA;PROAIR HFA) inhaler 6 puff (6 puffs Inhalation Given 1/7/21 1805)   methylPREDNISolone sodium succinate (SOLU-Medrol) injection 125 mg (125 mg Intravenous Given 1/7/21 1810)   sodium chloride 0.9 % bolus 500 mL (0 mL Intravenous Stopped 1/7/21 1845)   iopamidol (ISOVUE-370) 76 % injection 100 mL (75 mL Intravenous Given 1/7/21 1938)   ipratropium-albuterol (DUO-NEB) nebulizer solution 3 mL (3 mL Nebulization Given 1/7/21 2011)     ECG/EMG Results (last 24 hours)     Procedure Component Value Units Date/Time    ECG 12 Lead [[id number]] Collected: 01/07/21 1632     Updated: 01/07/21 1632        ECG 12 Lead   Final Result   Test Reason : SOA Protocol   Blood Pressure : **/** mmHG   Vent. Rate : 088 BPM     Atrial Rate : 088 BPM      P-R Int : 126 ms          QRS Dur : 078 ms       QT Int : 360 ms       P-R-T Axes : 081 075 066 degrees      QTc Int : 435 ms      Normal sinus rhythm   Normal ECG   When compared with ECG of 05-JAN-2021 16:58,   No significant change was found   Confirmed by MADISON TRAN MD ([id number]) on 1/7/2021 5:59:35 PM      Referred By:  EDMD           Confirmed By:MADISON TRAN MD                                                 [facility]    Final diagnoses:   Pneumonia of left upper lobe due to infectious organism   Pulmonary nodule   Shortness of breath   COPD exacerbation (CMS/HCC)   Pulmonary  emphysema, unspecified emphysema type (CMS/[facility])   Tobacco abuse       Documentation assistance provided by eddie Jacques.  Information recorded by the eddie was done at my direction and has been verified and validated by me.     Leroy Jacques  01/07/21 1757       Leory Jacques  01/07/21 1758       Kirt Santacruz MD  01/07/21 0136

## 2021-01-08 NOTE — DISCHARGE INSTRUCTIONS
Stop smoking    Use the NicoDerm patches as directed for the next 28 days.  Continue that with the 14 mg patches thereafter.  Discussed this with Dr. Domínguez    Follow-up with Dr. Domínguez in the office at the beginning of this coming week as discussed.  Call tomorrow morning for an appointment.  Follow-up for the abnormal CT scan and COPD and pneumonia.  You did have some emphysema in the tops of your lungs, as well as some bronchitis in the area of pneumonia we discussed which was 2.2 cm.      Take the Z-Immanuel as directed.  Take the Augmentin twice a day for the next 10 days as discussed.  I have given you a Medrol Dosepak    Continue your current inhaler and use the albuterol as needed for bronchospasm and shortness of breath    I have referred you to the pulmonary nodule clinic.  They will need to see you within the next year to see if you will need another CT scan in 12 months for evaluation of the pulmonary nodule in your left lower lobe    Discuss evaluation in our pulmonary clinic in addition to the nodule clinic with Dr. Domínguez to decide whether this would be beneficial shoulder for you as well    Return to the ED at once if you have any acute urgent emergent or significant symptoms including increasing shortness of breath, fevers chills or worsening course or any other significant symptoms as discussed

## 2021-01-09 LAB
QT INTERVAL: 342 MS
QTC INTERVAL: 438 MS

## 2021-01-12 ENCOUNTER — OFFICE VISIT (OUTPATIENT)
Dept: PULMONOLOGY | Facility: CLINIC | Age: 61
End: 2021-01-12

## 2021-01-12 VITALS
HEART RATE: 95 BPM | WEIGHT: 110.25 LBS | TEMPERATURE: 98.7 F | RESPIRATION RATE: 18 BRPM | DIASTOLIC BLOOD PRESSURE: 70 MMHG | HEIGHT: 65 IN | OXYGEN SATURATION: 96 % | SYSTOLIC BLOOD PRESSURE: 100 MMHG | BODY MASS INDEX: 18.37 KG/M2

## 2021-01-12 DIAGNOSIS — Z72.0 TOBACCO ABUSE: ICD-10-CM

## 2021-01-12 DIAGNOSIS — J41.1 MUCOPURULENT CHRONIC BRONCHITIS (HCC): Primary | ICD-10-CM

## 2021-01-12 DIAGNOSIS — R91.8 MULTIPLE PULMONARY NODULES: ICD-10-CM

## 2021-01-12 PROCEDURE — 99204 OFFICE O/P NEW MOD 45 MIN: CPT | Performed by: INTERNAL MEDICINE

## 2021-01-12 RX ORDER — ONDANSETRON 8 MG/1
TABLET, ORALLY DISINTEGRATING ORAL
COMMUNITY
Start: 2020-12-16 | End: 2021-10-01

## 2021-01-12 RX ORDER — BENZONATATE 200 MG/1
200 CAPSULE ORAL EVERY 8 HOURS PRN
COMMUNITY
Start: 2021-01-08 | End: 2021-10-01

## 2021-01-12 NOTE — PROGRESS NOTES
New Patient Pulmonary Office Visit      Patient Name: Jessica Delgado    Referring Physician: Kirt Santacruz MD    Chief Complaint:    Chief Complaint   Patient presents with   • Pulmonary Nodule   • Pneumonia   • Cough       History of Present Illness: Jessica Delgado is a 60 y.o. female who is here today to establish care with Pulmonary.  Patient has a past medical history significant for COPD, GERD, and cervical cancer.  Who presents to pulmonary for evaluation of shortness of breath.  She was recently seen in the ER on  and 2021 secondary to a COPD exacerbation and started on Augmentin and Medrol Dosepak.  She underwent a CT scan of the chest which showed that she had a groundglass left upper lobe nodule that was roughly 2.2 cm.  She also was noted to have a groundglass nodular area in the right lower lobe as well that was roughly 2.2 cm in size.  She states that she is doing much better from a breathing standpoint since being seen in the ER and is about to finish up the antibiotics and steroids.  She is continuing her Advair but not taking it on a daily basis right now.    Review of Systems:   Review of Systems   HENT: Positive for congestion.    Respiratory: Positive for cough, shortness of breath and wheezing.    Cardiovascular: Negative for chest pain and leg swelling.       Past Medical History:   Past Medical History:   Diagnosis Date   • Cervical cancer (CMS/HCC)    • COPD (chronic obstructive pulmonary disease) (CMS/HCC)    • GERD (gastroesophageal reflux disease)    • History of nephrolithiasis    • IBS (irritable bowel syndrome)    • Ovarian cancer (CMS/HCC)    • Uterine cancer (CMS/HCC)        Past Surgical History:   Past Surgical History:   Procedure Laterality Date   • ABLATION OF DYSRHYTHMIC FOCUS     • CARDIAC CATHETERIZATION N/A 2017    Procedure: Left Heart Cath;  Surgeon: Travis Santoyo MD;  Location: [facility] CATH INVASIVE LOCATION;  Service:    •  SECTION      x 3    • CHOLECYSTECTOMY     • HYSTERECTOMY         Family History:   Family History   Problem Relation Age of Onset   • No Known Problems Mother    • Cancer Father    • Coronary artery disease Neg Hx        Social History:   Social History     Socioeconomic History   • Marital status:      Spouse name: Not on file   • Number of children: Not on file   • Years of education: Not on file   • Highest education level: Not on file   Tobacco Use   • Smoking status: Current Some Day Smoker     Packs/day: 0.50     Years: 30.00     Pack years: 15.00     Types: Cigarettes   • Smokeless tobacco: Never Used   • Tobacco comment: 5-6 cigarettes/day. states last day of smoking was yesterday   Substance and Sexual Activity   • Alcohol use: No     Frequency: Never     Comment: social   • Drug use: No   • Sexual activity: Defer       Medications:     Current Outpatient Medications:   •  albuterol sulfate  (90 Base) MCG/ACT inhaler, Inhale 2 puffs Every 4 (Four) Hours As Needed for Wheezing or Shortness of Air., Disp: 1 g, Rfl: 0  •  benzonatate (TESSALON) 200 MG capsule, Take 200 mg by mouth Every 8 (Eight) Hours As Needed., Disp: , Rfl:   •  fluticasone-salmeterol (ADVAIR) 500-50 MCG/DOSE DISKUS, Inhale 1 puff 2 (Two) Times a Day., Disp: , Rfl:   •  Linaclotide (LINZESS) 145 MCG capsule, Take 1 capsule by mouth Every Morning., Disp: , Rfl:   •  nicotine (NICODERM CQ) 21 MG/24HR patch, Place 1 patch on the skin as directed by provider Daily., Disp: 28 patch, Rfl: 0  •  ondansetron ODT (ZOFRAN-ODT) 8 MG disintegrating tablet, DISSOLVE 1 TABLET IN MOUTH EVERY 6 HOURS AS NEEDED FOR NAUSEA AND VOMITING, Disp: , Rfl:     Allergies:   No Known Allergies    Physical Exam:  Vital Signs:   Vitals:    01/12/21 1320   BP: 100/70   BP Location: Right arm   Patient Position: Sitting   Cuff Size: Adult   Pulse: 95   Resp: 18   Temp: 98.7 °F (37.1 °C)   SpO2: 96%  Comment: room air at rest   Weight: 50 kg (110 lb 4 oz)   Height: 165.1 cm  "(65\")       Physical Exam  Vitals signs and nursing note reviewed.   Constitutional:       General: She is not in acute distress.     Appearance: She is well-developed and normal weight. She is not ill-appearing or toxic-appearing.   HENT:      Head: Normocephalic and atraumatic.   Cardiovascular:      Rate and Rhythm: Normal rate and regular rhythm.      Pulses: Normal pulses.      Heart sounds: Normal heart sounds. No murmur. No friction rub. No gallop.    Pulmonary:      Effort: Pulmonary effort is normal. No respiratory distress.      Breath sounds: Normal breath sounds. No wheezing, rhonchi or rales.   Musculoskeletal:      Right lower leg: No edema.      Left lower leg: No edema.   Skin:     General: Skin is warm and dry.   Neurological:      Mental Status: She is alert and oriented to person, place, and time.         Results Review:   - I personally reviewed the pts imaging from left upper lobe 2.2 cm groundglass nodule, with a right lower lobe tree-in-bud nodular opacity.  - I personally reviewed the pts labs which was significant for a normal white blood cell count of 7.21, 13.1 hemoglobin, and normal platelet count 381.  Troponin was negative, and proBNP was 65.5.  Covid testing was negative x1.  Flu swab was also negative.  CMP was notable for an alk phos elevation of 197 this is roughly unchanged compared to 1/5/2021, with normal renal function, and otherwise only notable for a low sodium of 135.  - I personally reviewed the patient's chart with regard to her recent ER visits.    Assessment / Plan:   1. Mucopurulent chronic bronchitis (CMS/HCC) (Primary)  -COPD is better controlled now that she has been treated for exacerbation.  For now would like to go ahead and continue on the Advair we did discuss taking the Advair on a regular basis 1 puff twice daily, ultimately I will try to transition her to a LABA/LAMA combination inhaler at the next time I see her.  Otherwise continue to use albuterol on an " as-needed basis.    2. Multiple pulmonary nodules  -Given the 2.2 cm nodule this will need ongoing follow-up this is likely secondary to underlying factors but I cannot be sure of that at this time.  I will plan to repeat a CT scan in 3 months for now and then after 3 months we will continue to follow for roughly 5 years given the groundglass nature of the nodule.    3. Tobacco abuse  -Counseled patient on tobacco cessation, she needs to continue to use the nicotine patches and gum it is okay to use using combination.  We did discuss the psychological effects of tobacco abuse and how she needs to be  to manage the triggers.    Level of service justified based on 48 minutes spent in patient care on this date of service including, but not limited to: preparing to see the patient, obtaining and/or reviewing history, performing medically appropriate examination, ordering tests/medicine/procedures, independently interpreting results, documenting clinical information in EHR, and counseling/education of patient/family/caregiver. (Level 4 45-59 minutes; Level 5 60-74 minutes)    Follow Up:   Return in about 3 months (around 4/12/2021) for CT Chest with next visit.     MICHAEL Santacruz, DO  Pulmonary and Critical Care Medicine  Note Electronically Signed    Please note that portions of this note may have been completed with a voice recognition program. Efforts were made to edit the dictations, but occasionally words are mistranscribed.

## 2021-04-12 ENCOUNTER — HOSPITAL ENCOUNTER (OUTPATIENT)
Dept: CT IMAGING | Facility: HOSPITAL | Age: 61
Discharge: HOME | End: 2021-04-12
Admitting: INTERNAL MEDICINE

## 2021-04-12 DIAGNOSIS — R91.8 MULTIPLE PULMONARY NODULES: ICD-10-CM

## 2021-04-12 LAB — CREAT BLDA-MCNC: 0.7 MG/DL (ref 0.6–1.3)

## 2021-04-12 PROCEDURE — 25010000002 IOPAMIDOL 61 % SOLUTION: Performed by: INTERNAL MEDICINE

## 2021-04-12 PROCEDURE — 82565 ASSAY OF CREATININE: CPT

## 2021-04-12 PROCEDURE — 71260 CT THORAX DX C+: CPT

## 2021-04-12 RX ADMIN — IOPAMIDOL 95 ML: 612 INJECTION, SOLUTION INTRAVENOUS at 09:11

## 2021-04-15 ENCOUNTER — OFFICE VISIT (OUTPATIENT)
Dept: PULMONOLOGY | Facility: CLINIC | Age: 61
End: 2021-04-15

## 2021-04-15 VITALS
BODY MASS INDEX: 18.83 KG/M2 | WEIGHT: 113 LBS | HEIGHT: 65 IN | SYSTOLIC BLOOD PRESSURE: 122 MMHG | TEMPERATURE: 98 F | HEART RATE: 54 BPM | OXYGEN SATURATION: 90 % | DIASTOLIC BLOOD PRESSURE: 70 MMHG

## 2021-04-15 DIAGNOSIS — R91.8 MULTIPLE PULMONARY NODULES: ICD-10-CM

## 2021-04-15 DIAGNOSIS — Z72.0 TOBACCO ABUSE: ICD-10-CM

## 2021-04-15 DIAGNOSIS — J41.1 MUCOPURULENT CHRONIC BRONCHITIS (HCC): Primary | ICD-10-CM

## 2021-04-15 PROCEDURE — 99214 OFFICE O/P EST MOD 30 MIN: CPT | Performed by: INTERNAL MEDICINE

## 2021-04-15 RX ORDER — TIOTROPIUM BROMIDE AND OLODATEROL 3.124; 2.736 UG/1; UG/1
2 SPRAY, METERED RESPIRATORY (INHALATION)
Qty: 4 G | Refills: 5 | Status: SHIPPED | OUTPATIENT
Start: 2021-04-15 | End: 2022-03-22

## 2021-04-15 NOTE — PROGRESS NOTES
"Follow Up Office Note       Patient Name: Jessica Delgado    Referring Physician: No ref. provider found    Chief Complaint:    Chief Complaint   Patient presents with   • mucopurulent chronic bronchitis       History of Present Illness: Jessica Delgado is a 60 y.o. female who is here today to follow-up care with Pulmonary.  Patient has a past medical history significant for COPD, GERD, and cervical cancer.  Patient is here today to follow-up on her pulmonary nodules in addition to COPD.  Currently she is utilizing Advair.  She continues to smoke.  No exacerbation since her last visit.  She denies any fever, chills, nausea, or vomiting.  She has no acute complaints.    Review of Systems:   Review of Systems   Constitutional: Negative for chills, fatigue and fever.   HENT: Negative for congestion and voice change.    Eyes: Negative for blurred vision.   Respiratory: Positive for cough. Negative for shortness of breath and wheezing.    Cardiovascular: Negative for chest pain.   Skin: Negative for dry skin.   Hematological: Negative for adenopathy.   Psychiatric/Behavioral: Negative for agitation and depressed mood.       The following portions of the patient's history were reviewed and updated as appropriate: allergies, current medications, past family history, past medical history, past social history, past surgical history and problem list.    Physical Exam:  Vital Signs:   Vitals:    04/15/21 0951   BP: 122/70   Pulse: 54   Temp: 98 °F (36.7 °C)   SpO2: 90%  Comment: reting a room air   Weight: 51.3 kg (113 lb)   Height: 165.1 cm (65\")       Physical Exam  Vitals and nursing note reviewed.   Constitutional:       General: She is not in acute distress.     Appearance: She is well-developed and normal weight. She is not ill-appearing or toxic-appearing.   HENT:      Head: Normocephalic and atraumatic.   Cardiovascular:      Rate and Rhythm: Normal rate and regular rhythm.      Pulses: Normal pulses.      Heart " sounds: Normal heart sounds. No murmur heard.   No friction rub. No gallop.    Pulmonary:      Effort: Pulmonary effort is normal. No respiratory distress.      Breath sounds: Normal breath sounds. No wheezing, rhonchi or rales.   Musculoskeletal:      Right lower leg: No edema.      Left lower leg: No edema.   Skin:     General: Skin is warm and dry.   Neurological:      Mental Status: She is alert and oriented to person, place, and time.         There is no immunization history on file for this patient.    Results Review:   -I personally viewed the patient's CT scan from 4/12/2021 which showed that the previously seen groundglass nodule in the left upper lobe is now resolved, and there is a small 7 mm pleural-based nodule in the left lower lobe that is unchanged from previous CT scans.   - imaging from left upper lobe 2.2 cm groundglass nodule, with a right lower lobe tree-in-bud nodular opacity.  - labs which was significant for a normal white blood cell count of 7.21, 13.1 hemoglobin, and normal platelet count 381.  Troponin was negative, and proBNP was 65.5.  Covid testing was negative x1.  Flu swab was also negative.  CMP was notable for an alk phos elevation of 197 this is roughly unchanged compared to 1/5/2021, with normal renal function, and otherwise only notable for a low sodium of 135.    Assessment / Plan:   1. Mucopurulent chronic bronchitis (CMS/HCC) (Primary)  -For COPD I am going to have her stop the Advair and start Stiolto 2 puffs once daily.  I did give her samples in the office today and sent her a new prescription.  I continue to recommend tobacco cessation.  -Get a full set of pulmonary function testing at her next visit.  Should continue to utilize albuterol on a as needed basis    2. Multiple pulmonary nodules  -Reviewed the CT scan of her chest, and now she only has a left lower lobe 7 mm nodule that is pleural-based and unchanged over the last 3 months.  The previously seen larger  groundglass opacities were now resolved.  Aspect that she had underlying pneumonia at that time which is now improved.  I explained to her that no matter what she will need a CT scan in 1 year currently though this is for the 7 mm nodule given her tobacco history she also meets recommendations for lung cancer screening which we will continue to follow in the future.  I have ordered a CT scan to be done in 1 year    3. Tobacco abuse  -Continue to recommend tobacco cessation, patient is currently not ready to quit.  She does meet qualifications for lung cancer screening whenever we have stop screening for her current 7 mm nodule.    Level of service justified based on 30 minutes spent in patient care on this date of service including, but not limited to: preparing to see the patient, obtaining and/or reviewing history, performing medically appropriate examination, ordering tests/medicine/procedures, independently interpreting results, documenting clinical information in EHR, and counseling/education of patient/family/caregiver. (Level 4 30-39 minutes; Level 5 40-54 minutes)    Follow Up:   Return in about 6 months (around 10/15/2021).       MICHAEL Santacruz,   Pulmonary and Critical Care Medicine  Note Electronically Signed    Please note that portions of this note may have been completed with a voice recognition program. Efforts were made to edit the dictations, but occasionally words are mistranscribed.

## 2021-05-05 ENCOUNTER — APPOINTMENT (OUTPATIENT)
Dept: GENERAL RADIOLOGY | Facility: HOSPITAL | Age: 61
End: 2021-05-05

## 2021-05-05 ENCOUNTER — HOSPITAL ENCOUNTER (EMERGENCY)
Facility: HOSPITAL | Age: 61
Discharge: HOME | End: 2021-05-05
Attending: EMERGENCY MEDICINE | Admitting: EMERGENCY MEDICINE

## 2021-05-05 VITALS
RESPIRATION RATE: 20 BRPM | BODY MASS INDEX: 18.99 KG/M2 | DIASTOLIC BLOOD PRESSURE: 80 MMHG | WEIGHT: 114 LBS | HEART RATE: 81 BPM | OXYGEN SATURATION: 98 % | TEMPERATURE: 97.7 F | SYSTOLIC BLOOD PRESSURE: 105 MMHG | HEIGHT: 65 IN

## 2021-05-05 DIAGNOSIS — J44.1 ACUTE EXACERBATION OF CHRONIC OBSTRUCTIVE PULMONARY DISEASE (COPD) (HCC): Primary | ICD-10-CM

## 2021-05-05 DIAGNOSIS — J41.1 MUCOPURULENT CHRONIC BRONCHITIS (HCC): ICD-10-CM

## 2021-05-05 LAB
ALBUMIN SERPL-MCNC: 4.2 G/DL (ref 3.5–5.2)
ALBUMIN/GLOB SERPL: 1.4 G/DL
ALP SERPL-CCNC: 139 U/L (ref 39–117)
ALT SERPL W P-5'-P-CCNC: 13 U/L (ref 1–33)
ANION GAP SERPL CALCULATED.3IONS-SCNC: 12 MMOL/L (ref 5–15)
AST SERPL-CCNC: 24 U/L (ref 1–32)
BASOPHILS # BLD AUTO: 0.05 10*3/MM3 (ref 0–0.2)
BASOPHILS NFR BLD AUTO: 1 % (ref 0–1.5)
BILIRUB SERPL-MCNC: 0.3 MG/DL (ref 0–1.2)
BUN SERPL-MCNC: 7 MG/DL (ref 8–23)
BUN/CREAT SERPL: 9.3 (ref 7–25)
CALCIUM SPEC-SCNC: 9.4 MG/DL (ref 8.6–10.5)
CHLORIDE SERPL-SCNC: 101 MMOL/L (ref 98–107)
CO2 SERPL-SCNC: 26 MMOL/L (ref 22–29)
CREAT SERPL-MCNC: 0.75 MG/DL (ref 0.57–1)
DEPRECATED RDW RBC AUTO: 46.9 FL (ref 37–54)
EOSINOPHIL # BLD AUTO: 0.19 10*3/MM3 (ref 0–0.4)
EOSINOPHIL NFR BLD AUTO: 3.7 % (ref 0.3–6.2)
ERYTHROCYTE [DISTWIDTH] IN BLOOD BY AUTOMATED COUNT: 14.5 % (ref 12.3–15.4)
FLUAV RNA RESP QL NAA+PROBE: NOT DETECTED
FLUBV RNA RESP QL NAA+PROBE: NOT DETECTED
GFR SERPL CREATININE-BSD FRML MDRD: 79 ML/MIN/1.73
GLOBULIN UR ELPH-MCNC: 3 GM/DL
GLUCOSE SERPL-MCNC: 98 MG/DL (ref 65–99)
HCT VFR BLD AUTO: 40.9 % (ref 34–46.6)
HGB BLD-MCNC: 13.4 G/DL (ref 12–15.9)
HOLD SPECIMEN: NORMAL
IMM GRANULOCYTES # BLD AUTO: 0.02 10*3/MM3 (ref 0–0.05)
IMM GRANULOCYTES NFR BLD AUTO: 0.4 % (ref 0–0.5)
LYMPHOCYTES # BLD AUTO: 1.29 10*3/MM3 (ref 0.7–3.1)
LYMPHOCYTES NFR BLD AUTO: 25.4 % (ref 19.6–45.3)
MCH RBC QN AUTO: 28.9 PG (ref 26.6–33)
MCHC RBC AUTO-ENTMCNC: 32.8 G/DL (ref 31.5–35.7)
MCV RBC AUTO: 88.3 FL (ref 79–97)
MONOCYTES # BLD AUTO: 0.81 10*3/MM3 (ref 0.1–0.9)
MONOCYTES NFR BLD AUTO: 16 % (ref 5–12)
NEUTROPHILS NFR BLD AUTO: 2.71 10*3/MM3 (ref 1.7–7)
NEUTROPHILS NFR BLD AUTO: 53.5 % (ref 42.7–76)
NRBC BLD AUTO-RTO: 0 /100 WBC (ref 0–0.2)
NT-PROBNP SERPL-MCNC: 116.1 PG/ML (ref 0–900)
PLATELET # BLD AUTO: 282 10*3/MM3 (ref 140–450)
PMV BLD AUTO: 10.1 FL (ref 6–12)
POTASSIUM SERPL-SCNC: 3.3 MMOL/L (ref 3.5–5.2)
PROT SERPL-MCNC: 7.2 G/DL (ref 6–8.5)
QT INTERVAL: 368 MS
QTC INTERVAL: 447 MS
RBC # BLD AUTO: 4.63 10*6/MM3 (ref 3.77–5.28)
SARS-COV-2 RNA RESP QL NAA+PROBE: NOT DETECTED
SODIUM SERPL-SCNC: 139 MMOL/L (ref 136–145)
TROPONIN T SERPL-MCNC: <0.01 NG/ML (ref 0–0.03)
WBC # BLD AUTO: 5.07 10*3/MM3 (ref 3.4–10.8)
WHOLE BLOOD HOLD SPECIMEN: NORMAL
WHOLE BLOOD HOLD SPECIMEN: NORMAL

## 2021-05-05 PROCEDURE — 96365 THER/PROPH/DIAG IV INF INIT: CPT

## 2021-05-05 PROCEDURE — 93005 ELECTROCARDIOGRAM TRACING: CPT

## 2021-05-05 PROCEDURE — 96375 TX/PRO/DX INJ NEW DRUG ADDON: CPT

## 2021-05-05 PROCEDURE — 85025 COMPLETE CBC W/AUTO DIFF WBC: CPT | Performed by: EMERGENCY MEDICINE

## 2021-05-05 PROCEDURE — 80053 COMPREHEN METABOLIC PANEL: CPT | Performed by: EMERGENCY MEDICINE

## 2021-05-05 PROCEDURE — 87636 SARSCOV2 & INF A&B AMP PRB: CPT | Performed by: PHYSICIAN ASSISTANT

## 2021-05-05 PROCEDURE — 94799 UNLISTED PULMONARY SVC/PX: CPT

## 2021-05-05 PROCEDURE — 25010000002 METHYLPREDNISOLONE PER 125 MG: Performed by: PHYSICIAN ASSISTANT

## 2021-05-05 PROCEDURE — 93005 ELECTROCARDIOGRAM TRACING: CPT | Performed by: EMERGENCY MEDICINE

## 2021-05-05 PROCEDURE — 99284 EMERGENCY DEPT VISIT MOD MDM: CPT

## 2021-05-05 PROCEDURE — 84484 ASSAY OF TROPONIN QUANT: CPT | Performed by: EMERGENCY MEDICINE

## 2021-05-05 PROCEDURE — 25010000002 CEFTRIAXONE PER 250 MG: Performed by: PHYSICIAN ASSISTANT

## 2021-05-05 PROCEDURE — 71046 X-RAY EXAM CHEST 2 VIEWS: CPT

## 2021-05-05 PROCEDURE — 83880 ASSAY OF NATRIURETIC PEPTIDE: CPT | Performed by: EMERGENCY MEDICINE

## 2021-05-05 PROCEDURE — 94640 AIRWAY INHALATION TREATMENT: CPT

## 2021-05-05 RX ORDER — PREDNISONE 20 MG/1
20 TABLET ORAL 2 TIMES DAILY
Qty: 10 TABLET | Refills: 0 | Status: SHIPPED | OUTPATIENT
Start: 2021-05-05 | End: 2021-05-10

## 2021-05-05 RX ORDER — AZITHROMYCIN 250 MG/1
TABLET, FILM COATED ORAL
Qty: 4 TABLET | Refills: 0 | OUTPATIENT
Start: 2021-05-05 | End: 2021-10-01

## 2021-05-05 RX ORDER — ALBUTEROL SULFATE 90 UG/1
2 AEROSOL, METERED RESPIRATORY (INHALATION) ONCE
Status: COMPLETED | OUTPATIENT
Start: 2021-05-05 | End: 2021-05-05

## 2021-05-05 RX ORDER — METHYLPREDNISOLONE SODIUM SUCCINATE 125 MG/2ML
125 INJECTION, POWDER, LYOPHILIZED, FOR SOLUTION INTRAMUSCULAR; INTRAVENOUS ONCE
Status: COMPLETED | OUTPATIENT
Start: 2021-05-05 | End: 2021-05-05

## 2021-05-05 RX ORDER — SODIUM CHLORIDE 0.9 % (FLUSH) 0.9 %
10 SYRINGE (ML) INJECTION AS NEEDED
Status: DISCONTINUED | OUTPATIENT
Start: 2021-05-05 | End: 2021-05-05 | Stop reason: HOSPADM

## 2021-05-05 RX ORDER — ALBUTEROL SULFATE 90 UG/1
2 AEROSOL, METERED RESPIRATORY (INHALATION)
Status: DISCONTINUED | OUTPATIENT
Start: 2021-05-05 | End: 2021-05-05

## 2021-05-05 RX ORDER — AZITHROMYCIN 250 MG/1
500 TABLET, FILM COATED ORAL ONCE
Status: COMPLETED | OUTPATIENT
Start: 2021-05-05 | End: 2021-05-05

## 2021-05-05 RX ADMIN — METHYLPREDNISOLONE SODIUM SUCCINATE 125 MG: 125 INJECTION, POWDER, FOR SOLUTION INTRAMUSCULAR; INTRAVENOUS at 10:35

## 2021-05-05 RX ADMIN — AZITHROMYCIN MONOHYDRATE 500 MG: 250 TABLET ORAL at 13:36

## 2021-05-05 RX ADMIN — ALBUTEROL SULFATE 2 PUFF: 108 AEROSOL, METERED RESPIRATORY (INHALATION) at 11:21

## 2021-05-05 RX ADMIN — SODIUM CHLORIDE 1 G: 900 INJECTION INTRAVENOUS at 13:36

## 2021-10-01 ENCOUNTER — APPOINTMENT (OUTPATIENT)
Dept: GENERAL RADIOLOGY | Facility: HOSPITAL | Age: 61
End: 2021-10-01

## 2021-10-01 ENCOUNTER — HOSPITAL ENCOUNTER (EMERGENCY)
Facility: HOSPITAL | Age: 61
Discharge: HOME | End: 2021-10-01
Attending: EMERGENCY MEDICINE | Admitting: EMERGENCY MEDICINE

## 2021-10-01 ENCOUNTER — APPOINTMENT (OUTPATIENT)
Dept: CT IMAGING | Facility: HOSPITAL | Age: 61
End: 2021-10-01

## 2021-10-01 VITALS
SYSTOLIC BLOOD PRESSURE: 106 MMHG | HEART RATE: 78 BPM | TEMPERATURE: 98 F | OXYGEN SATURATION: 95 % | WEIGHT: 110 LBS | BODY MASS INDEX: 18.33 KG/M2 | DIASTOLIC BLOOD PRESSURE: 65 MMHG | RESPIRATION RATE: 16 BRPM | HEIGHT: 65 IN

## 2021-10-01 DIAGNOSIS — J44.9 CHRONIC OBSTRUCTIVE PULMONARY DISEASE, UNSPECIFIED COPD TYPE (HCC): ICD-10-CM

## 2021-10-01 DIAGNOSIS — R07.9 CHEST PAIN, UNSPECIFIED TYPE: Primary | ICD-10-CM

## 2021-10-01 LAB
ALBUMIN SERPL-MCNC: 4.9 G/DL (ref 3.5–5.2)
ALBUMIN/GLOB SERPL: 1.5 G/DL
ALP SERPL-CCNC: 129 U/L (ref 39–117)
ALT SERPL W P-5'-P-CCNC: 21 U/L (ref 1–33)
ANION GAP SERPL CALCULATED.3IONS-SCNC: 12 MMOL/L (ref 5–15)
AST SERPL-CCNC: 33 U/L (ref 1–32)
BASOPHILS # BLD AUTO: 0.12 10*3/MM3 (ref 0–0.2)
BASOPHILS NFR BLD AUTO: 1.7 % (ref 0–1.5)
BILIRUB SERPL-MCNC: 0.2 MG/DL (ref 0–1.2)
BUN SERPL-MCNC: 11 MG/DL (ref 8–23)
BUN/CREAT SERPL: 14.7 (ref 7–25)
CALCIUM SPEC-SCNC: 9.7 MG/DL (ref 8.6–10.5)
CHLORIDE SERPL-SCNC: 102 MMOL/L (ref 98–107)
CO2 SERPL-SCNC: 24 MMOL/L (ref 22–29)
CREAT SERPL-MCNC: 0.75 MG/DL (ref 0.57–1)
D DIMER PPP FEU-MCNC: 0.65 MCGFEU/ML (ref 0–0.56)
DEPRECATED RDW RBC AUTO: 46.1 FL (ref 37–54)
EOSINOPHIL # BLD AUTO: 0.4 10*3/MM3 (ref 0–0.4)
EOSINOPHIL NFR BLD AUTO: 5.7 % (ref 0.3–6.2)
ERYTHROCYTE [DISTWIDTH] IN BLOOD BY AUTOMATED COUNT: 14.2 % (ref 12.3–15.4)
GFR SERPL CREATININE-BSD FRML MDRD: 79 ML/MIN/1.73
GLOBULIN UR ELPH-MCNC: 3.2 GM/DL
GLUCOSE SERPL-MCNC: 85 MG/DL (ref 65–99)
HCT VFR BLD AUTO: 45.1 % (ref 34–46.6)
HGB BLD-MCNC: 14.9 G/DL (ref 12–15.9)
HOLD SPECIMEN: NORMAL
IMM GRANULOCYTES # BLD AUTO: 0.01 10*3/MM3 (ref 0–0.05)
IMM GRANULOCYTES NFR BLD AUTO: 0.1 % (ref 0–0.5)
LIPASE SERPL-CCNC: 36 U/L (ref 13–60)
LYMPHOCYTES # BLD AUTO: 2.16 10*3/MM3 (ref 0.7–3.1)
LYMPHOCYTES NFR BLD AUTO: 30.6 % (ref 19.6–45.3)
MCH RBC QN AUTO: 29.5 PG (ref 26.6–33)
MCHC RBC AUTO-ENTMCNC: 33 G/DL (ref 31.5–35.7)
MCV RBC AUTO: 89.3 FL (ref 79–97)
MONOCYTES # BLD AUTO: 0.58 10*3/MM3 (ref 0.1–0.9)
MONOCYTES NFR BLD AUTO: 8.2 % (ref 5–12)
NEUTROPHILS NFR BLD AUTO: 3.79 10*3/MM3 (ref 1.7–7)
NEUTROPHILS NFR BLD AUTO: 53.7 % (ref 42.7–76)
NRBC BLD AUTO-RTO: 0 /100 WBC (ref 0–0.2)
NT-PROBNP SERPL-MCNC: 49.3 PG/ML (ref 0–900)
PLATELET # BLD AUTO: 309 10*3/MM3 (ref 140–450)
PMV BLD AUTO: 10.5 FL (ref 6–12)
POTASSIUM SERPL-SCNC: 4.2 MMOL/L (ref 3.5–5.2)
PROT SERPL-MCNC: 8.1 G/DL (ref 6–8.5)
QT INTERVAL: 362 MS
QT INTERVAL: 394 MS
QTC INTERVAL: 435 MS
QTC INTERVAL: 443 MS
RBC # BLD AUTO: 5.05 10*6/MM3 (ref 3.77–5.28)
SODIUM SERPL-SCNC: 138 MMOL/L (ref 136–145)
TROPONIN T SERPL-MCNC: <0.01 NG/ML (ref 0–0.03)
TROPONIN T SERPL-MCNC: <0.01 NG/ML (ref 0–0.03)
WBC # BLD AUTO: 7.06 10*3/MM3 (ref 3.4–10.8)
WHOLE BLOOD HOLD SPECIMEN: NORMAL
WHOLE BLOOD HOLD SPECIMEN: NORMAL

## 2021-10-01 PROCEDURE — 84484 ASSAY OF TROPONIN QUANT: CPT | Performed by: EMERGENCY MEDICINE

## 2021-10-01 PROCEDURE — 0 IOPAMIDOL PER 1 ML: Performed by: EMERGENCY MEDICINE

## 2021-10-01 PROCEDURE — 83880 ASSAY OF NATRIURETIC PEPTIDE: CPT | Performed by: EMERGENCY MEDICINE

## 2021-10-01 PROCEDURE — 93005 ELECTROCARDIOGRAM TRACING: CPT | Performed by: EMERGENCY MEDICINE

## 2021-10-01 PROCEDURE — 83690 ASSAY OF LIPASE: CPT | Performed by: EMERGENCY MEDICINE

## 2021-10-01 PROCEDURE — 71275 CT ANGIOGRAPHY CHEST: CPT

## 2021-10-01 PROCEDURE — 80053 COMPREHEN METABOLIC PANEL: CPT | Performed by: EMERGENCY MEDICINE

## 2021-10-01 PROCEDURE — 71045 X-RAY EXAM CHEST 1 VIEW: CPT

## 2021-10-01 PROCEDURE — 85379 FIBRIN DEGRADATION QUANT: CPT | Performed by: NURSE PRACTITIONER

## 2021-10-01 PROCEDURE — 25010000002 METHYLPREDNISOLONE PER 125 MG: Performed by: NURSE PRACTITIONER

## 2021-10-01 PROCEDURE — 99284 EMERGENCY DEPT VISIT MOD MDM: CPT

## 2021-10-01 PROCEDURE — 94640 AIRWAY INHALATION TREATMENT: CPT

## 2021-10-01 PROCEDURE — 85025 COMPLETE CBC W/AUTO DIFF WBC: CPT | Performed by: EMERGENCY MEDICINE

## 2021-10-01 PROCEDURE — 96374 THER/PROPH/DIAG INJ IV PUSH: CPT

## 2021-10-01 RX ORDER — ASPIRIN 81 MG/1
324 TABLET, CHEWABLE ORAL ONCE
Status: COMPLETED | OUTPATIENT
Start: 2021-10-01 | End: 2021-10-01

## 2021-10-01 RX ORDER — METHYLPREDNISOLONE SODIUM SUCCINATE 125 MG/2ML
125 INJECTION, POWDER, LYOPHILIZED, FOR SOLUTION INTRAMUSCULAR; INTRAVENOUS ONCE
Status: COMPLETED | OUTPATIENT
Start: 2021-10-01 | End: 2021-10-01

## 2021-10-01 RX ORDER — ASPIRIN 81 MG/1
324 TABLET, CHEWABLE ORAL ONCE
Status: CANCELLED | OUTPATIENT
Start: 2021-10-01 | End: 2021-10-01

## 2021-10-01 RX ORDER — IPRATROPIUM BROMIDE AND ALBUTEROL SULFATE 2.5; .5 MG/3ML; MG/3ML
3 SOLUTION RESPIRATORY (INHALATION) ONCE
Status: COMPLETED | OUTPATIENT
Start: 2021-10-01 | End: 2021-10-01

## 2021-10-01 RX ORDER — SODIUM CHLORIDE 0.9 % (FLUSH) 0.9 %
10 SYRINGE (ML) INJECTION AS NEEDED
Status: DISCONTINUED | OUTPATIENT
Start: 2021-10-01 | End: 2021-10-01 | Stop reason: HOSPADM

## 2021-10-01 RX ORDER — SODIUM CHLORIDE 0.9 % (FLUSH) 0.9 %
10 SYRINGE (ML) INJECTION AS NEEDED
Status: CANCELLED | OUTPATIENT
Start: 2021-10-01

## 2021-10-01 RX ADMIN — IOPAMIDOL 100 ML: 755 INJECTION, SOLUTION INTRAVENOUS at 14:15

## 2021-10-01 RX ADMIN — ASPIRIN 81 MG CHEWABLE TABLET 324 MG: 81 TABLET CHEWABLE at 10:07

## 2021-10-01 RX ADMIN — IPRATROPIUM BROMIDE AND ALBUTEROL SULFATE 3 ML: 2.5; .5 SOLUTION RESPIRATORY (INHALATION) at 13:56

## 2021-10-01 RX ADMIN — METHYLPREDNISOLONE SODIUM SUCCINATE 125 MG: 125 INJECTION, POWDER, FOR SOLUTION INTRAMUSCULAR; INTRAVENOUS at 13:43

## 2021-10-01 NOTE — ED PROVIDER NOTES
Subjective   Jessica Delgado is a 61yr old female that presents the emergency department for complaints of chest pain.  Patient reports that she developed chest tightness on Monday.  She was seen at her primary care physician's office on Tuesday.  She had a negative Covid swab and flu swab obtained at this time.  She advises that the pain has been constant.  She reports that the pain radiated into her shoulders and back yesterday.  She complains of shortness of breath especially with exertion.  Patient reports a history of COPD.  She stopped smoking 1 week ago.  Patient had an ablation 20 years ago.  She denies any recent travel or recent immobilization.  Negative for any history of DVT.      History provided by:  Patient   used: No    Chest Pain  Pain location:  Substernal area  Pain quality: tightness    Pain radiates to:  L shoulder, upper back and R shoulder  Pain severity:  Mild  Duration:  5 days  Timing:  Constant  Associated symptoms: cough and shortness of breath    Associated symptoms: no abdominal pain, no dizziness, no fever, no nausea, no vomiting and no weakness    Risk factors: smoking    Risk factors: not male, not obese, no prior DVT/PE and no surgery        Review of Systems   Constitutional: Positive for chills. Negative for fever.   Respiratory: Positive for cough and shortness of breath.    Cardiovascular: Positive for chest pain.   Gastrointestinal: Negative for abdominal pain, nausea and vomiting.   Neurological: Negative for dizziness and weakness.   All other systems reviewed and are negative.      Past Medical History:   Diagnosis Date   • Cervical cancer (HCC)    • COPD (chronic obstructive pulmonary disease) (HCC)    • GERD (gastroesophageal reflux disease)    • History of nephrolithiasis    • IBS (irritable bowel syndrome)    • Ovarian cancer (HCC)    • Uterine cancer (HCC)        No Known Allergies    Past Surgical History:   Procedure Laterality Date   • ABLATION  OF DYSRHYTHMIC FOCUS     • CARDIAC CATHETERIZATION N/A 2017    Procedure: Left Heart Cath;  Surgeon: Travis Santoyo MD;  Location: [facility] CATH INVASIVE LOC[facility];  Service:    •  SECTION      x 3   • CHOLECYSTECTOMY     • HYSTERECTOMY         Family History   Problem Relation Age of Onset   • No Known Problems Mother    • Cancer Father    • Coronary artery disease Neg Hx        Social History     Socioeconomic History   • Marital status:      Spouse name: Not on file   • Number of children: Not on file   • Years of education: Not on file   • Highest education level: Not on file   Tobacco Use   • Smoking status: Former Smoker     Packs/day: 0.50     Years: 30.00     Pack years: 15.00     Types: Cigarettes   • Smokeless tobacco: Never Used   • Tobacco comment: 5-6 cigarettes/day. states last day of smoking was yesterday   Substance and Sexual Activity   • Alcohol use: No     Comment: social   • Drug use: No   • Sexual activity: Defer           Objective   Physical Exam  Vitals and nursing note reviewed.   Constitutional:       General: She is not in acute distress.     Appearance: Normal appearance. She is well-developed. She is not ill-appearing or toxic-appearing.   HENT:      Head: Normocephalic and atraumatic.   Eyes:      General: Lids are normal.      Extraocular Movements: Extraocular movements intact.      Conjunctiva/sclera: Conjunctivae normal.      Pupils: Pupils are equal, round, and reactive to light.   Neck:      Trachea: Trachea normal.   Cardiovascular:      Rate and Rhythm: Normal rate and regular rhythm.      Pulses: Normal pulses.      Heart sounds: Normal heart sounds.   Pulmonary:      Effort: Pulmonary effort is normal. No respiratory distress.      Breath sounds: Wheezing present. No decreased breath sounds, rhonchi or rales.   Abdominal:      General: Bowel sounds are normal.      Palpations: Abdomen is soft.      Tenderness: There is no abdominal tenderness.    Musculoskeletal:         General: Normal range of motion.      Cervical back: Full passive range of motion without pain and normal range of motion.      Right lower leg: No tenderness. No edema.      Left lower leg: No tenderness. No edema.   Skin:     General: Skin is warm and dry.      Findings: No rash.   Neurological:      Mental Status: She is alert and oriented to person, place, and time.      Cranial Nerves: No cranial nerve deficit.   Psychiatric:         Speech: Speech normal.         Behavior: Behavior normal. Behavior is cooperative.         Procedures           ED Course  ED Course as of Oct 01 1458   Fri Oct 01, 2021   1056 Troponin T: <0.010 [KG]   1132 D-Dimer, Quant(!): 0.65 [KG]   1452 Patient is feeling much better at this time.  I did offer the patient admission.  Patient declines.  Patient wishes to be discharged home.  Patient encouraged to follow-up with outpatient chest pain clinic.  Patient explains that last week she had a stomach virus.  Patient was encouraged to come into the hospital and be evaluated before her doctor would release her to return to work.  Patient agrees with the treatment plan and verbalized understanding.    [KG]      ED Course User Index  [KG] Meenu Hood, MARIUSZ           Recent Results (from the past 24 hour(s))   Troponin    Collection Time: 10/01/21 10:10 AM    Specimen: Blood   Result Value Ref Range    Troponin T <0.010 0.000 - 0.030 ng/mL   Comprehensive Metabolic Panel    Collection Time: 10/01/21 10:10 AM    Specimen: Blood   Result Value Ref Range    Glucose 85 65 - 99 mg/dL    BUN 11 8 - 23 mg/dL    Creatinine 0.75 0.57 - 1.00 mg/dL    Sodium 138 136 - 145 mmol/L    Potassium 4.2 3.5 - 5.2 mmol/L    Chloride 102 98 - 107 mmol/L    CO2 24.0 22.0 - 29.0 mmol/L    Calcium 9.7 8.6 - 10.5 mg/dL    Total Protein 8.1 6.0 - 8.5 g/dL    Albumin 4.90 3.50 - 5.20 g/dL    ALT (SGPT) 21 1 - 33 U/L    AST (SGOT) 33 (H) 1 - 32 U/L    Alkaline Phosphatase 129 (H) 39 -  117 U/L    Total Bilirubin 0.2 0.0 - 1.2 mg/dL    eGFR Non African Amer 79 >60 mL/min/1.73    Globulin 3.2 gm/dL    A/G Ratio 1.5 g/dL    BUN/Creatinine Ratio 14.7 7.0 - 25.0    Anion Gap 12.0 5.0 - 15.0 mmol/L   Lipase    Collection Time: 10/01/21 10:10 AM    Specimen: Blood   Result Value Ref Range    Lipase 36 13 - 60 U/L   BNP    Collection Time: 10/01/21 10:10 AM    Specimen: Blood   Result Value Ref Range    proBNP 49.3 0.0 - 900.0 pg/mL   Green Top (Gel)    Collection Time: 10/01/21 10:10 AM   Result Value Ref Range    Extra Tube Hold for add-ons.    Lavender Top    Collection Time: 10/01/21 10:10 AM   Result Value Ref Range    Extra Tube     Gold Top - SST    Collection Time: 10/01/21 10:10 AM   Result Value Ref Range    Extra Tube Hold for add-ons.    Gray Top    Collection Time: 10/01/21 10:10 AM   Result Value Ref Range    Extra Tube Hold for add-ons.    Light Blue Top    Collection Time: 10/01/21 10:10 AM   Result Value Ref Range    Extra Tube hold for add-on    CBC Auto Differential    Collection Time: 10/01/21 10:10 AM    Specimen: Blood   Result Value Ref Range    WBC 7.06 3.40 - 10.80 10*3/mm3    RBC 5.05 3.77 - 5.28 10*6/mm3    Hemoglobin 14.9 12.0 - 15.9 g/dL    Hematocrit 45.1 34.0 - 46.6 %    MCV 89.3 79.0 - 97.0 fL    MCH 29.5 26.6 - 33.0 pg    MCHC 33.0 31.5 - 35.7 g/dL    RDW 14.2 12.3 - 15.4 %    RDW-SD 46.1 37.0 - 54.0 fl    MPV 10.5 6.0 - 12.0 fL    Platelets 309 140 - 450 10*3/mm3    Neutrophil % 53.7 42.7 - 76.0 %    Lymphocyte % 30.6 19.6 - 45.3 %    Monocyte % 8.2 5.0 - 12.0 %    Eosinophil % 5.7 0.3 - 6.2 %    Basophil % 1.7 (H) 0.0 - 1.5 %    Immature Grans % 0.1 0.0 - 0.5 %    Neutrophils, Absolute 3.79 1.70 - 7.00 10*3/mm3    Lymphocytes, Absolute 2.16 0.70 - 3.10 10*3/mm3    Monocytes, Absolute 0.58 0.10 - 0.90 10*3/mm3    Eosinophils, Absolute 0.40 0.00 - 0.40 10*3/mm3    Basophils, Absolute 0.12 0.00 - 0.20 10*3/mm3    Immature Grans, Absolute 0.01 0.00 - 0.05 10*3/mm3    nRBC  0.0 0.0 - 0.2 /100 WBC   D-dimer, Quantitative    Collection Time: 10/01/21 10:10 AM    Specimen: Blood   Result Value Ref Range    D-Dimer, Quantitative 0.65 (H) 0.00 - 0.56 MCGFEU/mL   Troponin    Collection Time: 10/01/21 12:24 PM    Specimen: Blood   Result Value Ref Range    Troponin T <0.010 0.000 - 0.030 ng/mL     Note: In addition to lab results from this visit, the labs listed above may include labs taken at another facility or during a different encounter within the last 24 hours. Please correlate lab times with ED admission and discharge times for further clarification of the services performed during this visit.    CT Angiogram Chest   Preliminary Result   No evidence of acute intrathoracic abnormality, no CT   evidence of pulmonary embolism.              XR Chest 1 View   Preliminary Result   No acute cardiopulmonary disease.       D: 10/01/2021   E:  10/01/2021                Vitals:    10/01/21 1356 10/01/21 1400 10/01/21 1430 10/01/21 1448   BP:  100/69 106/65    BP Location:       Patient Position:       Pulse: 63 79 77 78   Resp: 16      Temp:       TempSrc:       SpO2: 98% 98% 96% 95%   Weight:       Height:         Medications   sodium chloride 0.9 % flush 10 mL (has no administration in time range)   aspirin chewable tablet 324 mg (324 mg Oral Given 10/1/21 1007)   ipratropium-albuterol (DUO-NEB) nebulizer solution 3 mL (3 mL Nebulization Given 10/1/21 1356)   methylPREDNISolone sodium succinate (SOLU-Medrol) injection 125 mg (125 mg Intravenous Given 10/1/21 1343)   iopamidol (ISOVUE-370) 76 % injection 100 mL (100 mL Intravenous Given 10/1/21 1415)     ECG/EMG Results (last 24 hours)     Procedure Component Value Units Date/Time    ECG 12 Lead [[id number]] Collected: 10/01/21 0949     Updated: 10/01/21 1008    ECG 12 Lead [[id number]] Collected: 10/01/21 1225     Updated: 10/01/21 1321        ECG 12 Lead         ECG 12 Lead                                             MDM    Final diagnoses:    Chest pain, unspecified type   Chronic obstructive pulmonary disease, unspecified COPD type (HCC)       ED Disposition  ED Disposition     ED Disposition Condition Comment    Discharge Stable           [facility] CARDIOLOGY  [address]  [place] [address] [phone]             Medication List      Stop    azithromycin 250 MG tablet  Commonly known as: ZITHROMAX     benzonatate 200 MG capsule  Commonly known as: TESSALON     Linzess 145 MCG capsule capsule  Generic drug: linaclotide     nicotine 21 MG/24HR patch  Commonly known as: NICODERM CQ     ondansetron ODT 8 MG disintegrating tablet  Commonly known as: ZOFRAN-ODT             Meenu Hood, APRN  10/01/21 1453

## 2021-10-12 ENCOUNTER — HOSPITAL ENCOUNTER (OUTPATIENT)
Dept: CARDIOLOGY | Facility: HOSPITAL | Age: 61
Discharge: HOME | End: 2021-10-12

## 2021-10-12 ENCOUNTER — OFFICE VISIT (OUTPATIENT)
Dept: CARDIOLOGY | Facility: HOSPITAL | Age: 61
End: 2021-10-12

## 2021-10-12 VITALS
WEIGHT: 107 LBS | RESPIRATION RATE: 16 BRPM | TEMPERATURE: 97.7 F | OXYGEN SATURATION: 99 % | HEART RATE: 96 BPM | DIASTOLIC BLOOD PRESSURE: 73 MMHG | BODY MASS INDEX: 17.83 KG/M2 | HEIGHT: 65 IN | SYSTOLIC BLOOD PRESSURE: 119 MMHG

## 2021-10-12 DIAGNOSIS — R06.09 DOE (DYSPNEA ON EXERTION): ICD-10-CM

## 2021-10-12 DIAGNOSIS — R07.9 CHEST PAIN, UNSPECIFIED TYPE: Primary | ICD-10-CM

## 2021-10-12 DIAGNOSIS — R00.2 PALPITATIONS: ICD-10-CM

## 2021-10-12 PROCEDURE — 93246 EXT ECG>7D<15D RECORDING: CPT

## 2021-10-12 PROCEDURE — 99204 OFFICE O/P NEW MOD 45 MIN: CPT | Performed by: NURSE PRACTITIONER

## 2021-10-12 NOTE — PATIENT INSTRUCTIONS
- Office will call to schedule testing    - Office will call or send message with testing results    
Detail Level: Zone

## 2021-10-12 NOTE — PROGRESS NOTES
[facility] Heart Monitor Documentation    Jessica GLEZ Sandy  1960  [id number]  10/12/21    [address]    [] ZIO XT Patch  Model [id number] Prescribed for N/A Days    · Serial Number: (N + 9 Digits) N   · Apply-By Date on Box:   · USPS Tracking Number:   · USPS Tracking        [] [employer] MINI PLUS Mobile Cardiac Telemetry  Model BGMINIPLUS Prescribed for  Days    · Serial Number: (BGM + 7 Digits) BGM  · Shipped-By Date on Box:   · UPS Tracking Number: [id number]  · UPS Tracking      [x] [employer] MINI Holter Monitor  Model BGMINIEL Prescribed for 7 Days    · Serial Number: (7 Digits) [id number]  · Shipped-By Date on Box: 09/29/2021  · UPS Tracking Number: [id number]  · UPS Tracking        This monitor was applied to the patient's chest and checked for proper functioning.  Ms. Jessica Delgado was instructed in the proper use of this monitor.  She was given the opportunity to ask questions and left the office with the device 's instruction manual.    Dyana Damon [facility], 09:38 EDT, 10/12/21                  [facility] 8.8.2019

## 2021-10-12 NOTE — PROGRESS NOTES
Chief Complaint  Chest Pain and Establish Care    Subjective    History of Present Illness {CC  Problem List  Visit  Diagnosis   Encounters  Notes  Medications  Labs  Result Review Imaging  Media :23}     Jessica Delgado, 61 y.o. female with COPD, unknown arrhythmia requiring chemical cardioversion/ablation (approx 20 years ago) presents to [facility] for Chest Pain and Establish Care.    Patient was recently evaluated at [facility] emergency department for complaints of chest pain.  Emergency department work-up revealed normal troponin/BNP, CXR with no acute cardiopulmonary findings, CT PE negative, and ECGs with no evidence of acute coronary syndrome. Patient was instructed to follow-up at [facility] heart and valve clinic for further evaluation.    Patient presents today with intermittent continued chest discomfort/tightness. Chest pain discomfort/tightness onset was 3-4 weeks ago, and symptoms have been intermittent since that time. Symptoms include tightness substernal chest. Associated tingling in her jaw, neck, and upper arm radiation/numbness. Pain generally lasts for minutes to hours. Associated symptoms include: dyspnea/MOHAMUD, . The pain is worsened by exertion/activity, partial relief by rest. She also endorses occasional racing heart and increased fatigue. The patient denies leg swelling, and syncope. Previous cardiac workup includes: [facility] 2017 with normal coronary arteries. Family history for premature cardiac disease includes: none.  She recently stopped smoking 1 week ago; 40 pack year history.       Objective     Vital Signs:   Vitals:    10/12/21 0834 10/12/21 0835 10/12/21 0836   BP: 124/69 117/64 119/73   BP Location: Right arm Left arm Left arm   Patient Position: Sitting Standing Sitting   Cuff Size: Adult Adult Adult   Pulse: 79 101 96   Resp:   16   Temp:  97.7 °F (36.5 °C) 97.7 °F (36.5 °C)   TempSrc:  Temporal Temporal   SpO2: 98% 96% 99%  "  Weight:   48.5 kg (107 lb)   Height:   165.1 cm (65\")     Body mass index is 17.81 kg/m².  Physical Exam  Vitals and nursing note reviewed.   Constitutional:       Appearance: Normal appearance.   HENT:      Head: Normocephalic.   Eyes:      Extraocular Movements: Extraocular movements intact.   Neck:      Vascular: No carotid bruit.   Cardiovascular:      Rate and Rhythm: Normal rate and regular rhythm.      Pulses: Normal pulses.      Heart sounds: Normal heart sounds, S1 normal and S2 normal. No murmur heard.      Pulmonary:      Effort: Pulmonary effort is normal. No respiratory distress.      Breath sounds: Normal breath sounds.   Musculoskeletal:      Cervical back: Neck supple.      Right lower leg: No edema.      Left lower leg: No edema.   Skin:     General: Skin is warm and dry.   Neurological:      General: No focal deficit present.      Mental Status: She is alert.   Psychiatric:         Mood and Affect: Mood normal.         Behavior: Behavior normal.         Thought Content: Thought content normal.        Data Reviewed:{ Labs  Result Review  Imaging  Med Tab  Media :23}     Troponin (10/01/2021 12:24)  D-dimer, Quantitative (10/01/2021 10:10)  BNP (10/01/2021 10:10)  Lipase (10/01/2021 10:10)  Comprehensive Metabolic Panel (10/01/2021 10:10)  CBC & Differential (10/01/2021 10:10)  Troponin (10/01/2021 10:10)  CT Angiogram Chest (10/01/2021 14:17)  XR Chest 1 View (10/01/2021 10:42)  ECG 12 Lead (10/01/2021 12:25)  ECG 12 Lead (10/01/2021 09:49)  Cardiac Catheterization/Vascular Study (02/02/2017 13:00)      Assessment and Plan {CC Problem List  Visit Diagnosis  ROS  Review (Popup)  Health Maintenance  Quality  BestPractice  Medications  SmartSets  SnapShot Encounters  Media :23}     1. Chest pain, unspecified type  -Intermittent, worsened with exertion.  Emergency department work-up unremarkable, no evidence of ACS.  Given her exertional symptoms and smoking history will pursue " ischemic evaluation with myocardial perfusion study.  -MARIXA score: 0  -Stress Test With Myocardial Perfusion (1 Day); Future    2. MOHAMUD (dyspnea on exertion)  -Recently increased MOHAMUD.  In setting of exertional chest pain will pursue ischemic evaluation and complete TTE for functional/structural assessment.  -Adult Transthoracic Echo Complete W/ Cont if Necessary Per Protocol; Future  -Stress Test With Myocardial Perfusion (1 Day); Future    3. Palpitations  -Intermittent palpitations/heart racing.  Describes a history of arrhythmia that required chemical cardioversion and ablation; data deficit.  Will complete Holter monitor for arrhythmia surveillance.  - Adult Transthoracic Echo Complete W/ Cont if Necessary Per Protocol; Future  - Holter Monitor - 72 Hour Up To 15 Days; Future      Follow Up {Instructions Charge Capture  Follow-up Communications :23}     Return in about 6 weeks (around 11/23/2021) for Office follow-up, Monitor results.    Patient was given instructions and counseling regarding her condition or for health maintenance advice. Please see specific information pulled into the AVS if appropriate.  Patient was instructed to call the [facility] Center with any questions, concerns, or worsening symptoms.    *Please note that portions of this note were completed with a voice recognition program. Efforts were made to edit the dictations, but occasionally words are mistranscribed.

## 2021-10-18 ENCOUNTER — HOSPITAL ENCOUNTER (OUTPATIENT)
Dept: CARDIOLOGY | Facility: HOSPITAL | Age: 61
Discharge: HOME | End: 2021-10-18
Admitting: NURSE PRACTITIONER

## 2021-10-18 VITALS
SYSTOLIC BLOOD PRESSURE: 124 MMHG | HEIGHT: 65 IN | WEIGHT: 107 LBS | BODY MASS INDEX: 17.83 KG/M2 | DIASTOLIC BLOOD PRESSURE: 80 MMHG | HEART RATE: 72 BPM

## 2021-10-18 DIAGNOSIS — R06.09 DOE (DYSPNEA ON EXERTION): ICD-10-CM

## 2021-10-18 DIAGNOSIS — R07.9 CHEST PAIN, UNSPECIFIED TYPE: ICD-10-CM

## 2021-10-18 LAB
BH CV REST NUCLEAR ISOTOPE DOSE: 9.4 MCI
BH CV STRESS BP STAGE 1: NORMAL
BH CV STRESS DURATION MIN STAGE 1: 3
BH CV STRESS DURATION MIN STAGE 2: 1
BH CV STRESS DURATION SEC STAGE 1: 0
BH CV STRESS DURATION SEC STAGE 2: 5
BH CV STRESS GRADE STAGE 1: 10
BH CV STRESS GRADE STAGE 2: 12
BH CV STRESS HR STAGE 1: 136
BH CV STRESS HR STAGE 2: 155
BH CV STRESS METS STAGE 1: 5
BH CV STRESS METS STAGE 2: 7.5
BH CV STRESS NUCLEAR ISOTOPE DOSE: 33 MCI
BH CV STRESS O2 STAGE 1: 98
BH CV STRESS O2 STAGE 2: 99
BH CV STRESS PROTOCOL 1: NORMAL
BH CV STRESS RECOVERY BP: NORMAL MMHG
BH CV STRESS RECOVERY HR: 80 BPM
BH CV STRESS RECOVERY O2: 99 %
BH CV STRESS SPEED STAGE 1: 1.7
BH CV STRESS SPEED STAGE 2: 2.5
BH CV STRESS STAGE 1: 1
BH CV STRESS STAGE 2: 2
LV EF NUC BP: 73 %
MAXIMAL PREDICTED HEART RATE: 159 BPM
PERCENT MAX PREDICTED HR: 97.48 %
STRESS BASELINE BP: NORMAL MMHG
STRESS BASELINE HR: 68 BPM
STRESS O2 SAT REST: 96 %
STRESS PERCENT HR: 115 %
STRESS POST ESTIMATED WORKLOAD: 5.9 METS
STRESS POST EXERCISE DUR MIN: 4 MIN
STRESS POST EXERCISE DUR SEC: 5 SEC
STRESS POST O2 SAT PEAK: 99 %
STRESS POST PEAK BP: NORMAL MMHG
STRESS POST PEAK HR: 155 BPM
STRESS TARGET HR: 135 BPM

## 2021-10-18 PROCEDURE — 93017 CV STRESS TEST TRACING ONLY: CPT

## 2021-10-18 PROCEDURE — 0 TECHNETIUM SESTAMIBI: Performed by: NURSE PRACTITIONER

## 2021-10-18 PROCEDURE — A9500 TC99M SESTAMIBI: HCPCS | Performed by: NURSE PRACTITIONER

## 2021-10-18 PROCEDURE — 78452 HT MUSCLE IMAGE SPECT MULT: CPT | Performed by: INTERNAL MEDICINE

## 2021-10-18 PROCEDURE — 93018 CV STRESS TEST I&R ONLY: CPT | Performed by: INTERNAL MEDICINE

## 2021-10-18 PROCEDURE — A9555 RB82 RUBIDIUM: HCPCS | Performed by: NURSE PRACTITIONER

## 2021-10-18 PROCEDURE — 0 RUBIDIUM CHLORIDE: Performed by: NURSE PRACTITIONER

## 2021-10-18 PROCEDURE — 78452 HT MUSCLE IMAGE SPECT MULT: CPT

## 2021-10-18 RX ADMIN — TECHNETIUM TC 99M SESTAMIBI 1 DOSE: 1 INJECTION INTRAVENOUS at 07:50

## 2021-10-18 RX ADMIN — TECHNETIUM TC 99M SESTAMIBI 1 DOSE: 1 INJECTION INTRAVENOUS at 09:30

## 2021-10-19 ENCOUNTER — HOSPITAL ENCOUNTER (OUTPATIENT)
Dept: CARDIOLOGY | Facility: HOSPITAL | Age: 61
Discharge: HOME | End: 2021-10-19
Admitting: NURSE PRACTITIONER

## 2021-10-19 DIAGNOSIS — R06.09 DOE (DYSPNEA ON EXERTION): ICD-10-CM

## 2021-10-19 DIAGNOSIS — R00.2 PALPITATIONS: ICD-10-CM

## 2021-10-19 LAB
BH CV ECHO MEAS - AO MAX PG (FULL): 2.6 MMHG
BH CV ECHO MEAS - AO MAX PG: 5 MMHG
BH CV ECHO MEAS - AO MEAN PG (FULL): 2 MMHG
BH CV ECHO MEAS - AO MEAN PG: 3 MMHG
BH CV ECHO MEAS - AO ROOT AREA (BSA CORRECTED): 2.1
BH CV ECHO MEAS - AO ROOT AREA: 8 CM^2
BH CV ECHO MEAS - AO ROOT DIAM: 3.2 CM
BH CV ECHO MEAS - AO V2 MAX: 113 CM/SEC
BH CV ECHO MEAS - AO V2 MEAN: 79.3 CM/SEC
BH CV ECHO MEAS - AO V2 VTI: 28.7 CM
BH CV ECHO MEAS - AVA(I,A): 1.6 CM^2
BH CV ECHO MEAS - AVA(I,D): 1.6 CM^2
BH CV ECHO MEAS - AVA(V,A): 1.8 CM^2
BH CV ECHO MEAS - AVA(V,D): 1.8 CM^2
BH CV ECHO MEAS - BSA(HAYCOCK): 1.5 M^2
BH CV ECHO MEAS - BSA: 1.5 M^2
BH CV ECHO MEAS - BZI_BMI: 17.8 KILOGRAMS/M^2
BH CV ECHO MEAS - BZI_METRIC_HEIGHT: 165.1 CM
BH CV ECHO MEAS - BZI_METRIC_WEIGHT: 48.5 KG
BH CV ECHO MEAS - EDV(CUBED): 44.7 ML
BH CV ECHO MEAS - EDV(MOD-SP2): 33 ML
BH CV ECHO MEAS - EDV(MOD-SP4): 35 ML
BH CV ECHO MEAS - EDV(TEICH): 52.6 ML
BH CV ECHO MEAS - EF(CUBED): 67.1 %
BH CV ECHO MEAS - EF(MOD-BP): 56 %
BH CV ECHO MEAS - EF(MOD-SP2): 57.6 %
BH CV ECHO MEAS - EF(MOD-SP4): 57.1 %
BH CV ECHO MEAS - EF(TEICH): 59.7 %
BH CV ECHO MEAS - ESV(CUBED): 14.7 ML
BH CV ECHO MEAS - ESV(MOD-SP2): 14 ML
BH CV ECHO MEAS - ESV(MOD-SP4): 15 ML
BH CV ECHO MEAS - ESV(TEICH): 21.2 ML
BH CV ECHO MEAS - FS: 31 %
BH CV ECHO MEAS - IVS/LVPW: 0.88
BH CV ECHO MEAS - IVSD: 0.77 CM
BH CV ECHO MEAS - LAD MAJOR: 3.6 CM
BH CV ECHO MEAS - LAT PEAK E' VEL: 8.4 CM/SEC
BH CV ECHO MEAS - LATERAL E/E' RATIO: 8.8
BH CV ECHO MEAS - LV DIASTOLIC VOL/BSA (35-75): 23.1 ML/M^2
BH CV ECHO MEAS - LV MASS(C)D: 80.2 GRAMS
BH CV ECHO MEAS - LV MASS(C)DI: 52.9 GRAMS/M^2
BH CV ECHO MEAS - LV MAX PG: 2.4 MMHG
BH CV ECHO MEAS - LV MEAN PG: 1 MMHG
BH CV ECHO MEAS - LV SYSTOLIC VOL/BSA (12-30): 9.9 ML/M^2
BH CV ECHO MEAS - LV V1 MAX: 78.2 CM/SEC
BH CV ECHO MEAS - LV V1 MEAN: 48.3 CM/SEC
BH CV ECHO MEAS - LV V1 VTI: 17.9 CM
BH CV ECHO MEAS - LVIDD: 3.6 CM
BH CV ECHO MEAS - LVIDS: 2.5 CM
BH CV ECHO MEAS - LVLD AP2: 5.8 CM
BH CV ECHO MEAS - LVLD AP4: 5.9 CM
BH CV ECHO MEAS - LVLS AP2: 4.5 CM
BH CV ECHO MEAS - LVLS AP4: 4.7 CM
BH CV ECHO MEAS - LVOT AREA (M): 2.5 CM^2
BH CV ECHO MEAS - LVOT AREA: 2.5 CM^2
BH CV ECHO MEAS - LVOT DIAM: 1.8 CM
BH CV ECHO MEAS - LVPWD: 0.88 CM
BH CV ECHO MEAS - MED PEAK E' VEL: 8.1 CM/SEC
BH CV ECHO MEAS - MEDIAL E/E' RATIO: 9.1
BH CV ECHO MEAS - MV A MAX VEL: 81.9 CM/SEC
BH CV ECHO MEAS - MV DEC SLOPE: 566 CM/SEC^2
BH CV ECHO MEAS - MV DEC TIME: 0.17 SEC
BH CV ECHO MEAS - MV E MAX VEL: 74 CM/SEC
BH CV ECHO MEAS - MV E/A: 0.9
BH CV ECHO MEAS - MV P1/2T MAX VEL: 93.7 CM/SEC
BH CV ECHO MEAS - MV P1/2T: 48.5 MSEC
BH CV ECHO MEAS - MVA P1/2T LCG: 2.3 CM^2
BH CV ECHO MEAS - MVA(P1/2T): 4.5 CM^2
BH CV ECHO MEAS - PA ACC SLOPE: 325 CM/SEC^2
BH CV ECHO MEAS - PA ACC TIME: 0.19 SEC
BH CV ECHO MEAS - PA PR(ACCEL): -5.2 MMHG
BH CV ECHO MEAS - RAP SYSTOLE: 3 MMHG
BH CV ECHO MEAS - RVSP: 23.3 MMHG
BH CV ECHO MEAS - SI(AO): 152.2 ML/M^2
BH CV ECHO MEAS - SI(CUBED): 19.8 ML/M^2
BH CV ECHO MEAS - SI(LVOT): 30 ML/M^2
BH CV ECHO MEAS - SI(MOD-SP2): 12.5 ML/M^2
BH CV ECHO MEAS - SI(MOD-SP4): 13.2 ML/M^2
BH CV ECHO MEAS - SI(TEICH): 20.7 ML/M^2
BH CV ECHO MEAS - SV(AO): 230.8 ML
BH CV ECHO MEAS - SV(CUBED): 30 ML
BH CV ECHO MEAS - SV(LVOT): 45.5 ML
BH CV ECHO MEAS - SV(MOD-SP2): 19 ML
BH CV ECHO MEAS - SV(MOD-SP4): 20 ML
BH CV ECHO MEAS - SV(TEICH): 31.4 ML
BH CV ECHO MEAS - TR MAX PG: 20.3 MMHG
BH CV ECHO MEAS - TR MAX VEL: 225 CM/SEC
BH CV ECHO MEASUREMENTS AVERAGE E/E' RATIO: 8.97

## 2021-10-19 PROCEDURE — 93306 TTE W/DOPPLER COMPLETE: CPT | Performed by: INTERNAL MEDICINE

## 2021-10-19 PROCEDURE — 93306 TTE W/DOPPLER COMPLETE: CPT

## 2021-10-20 DIAGNOSIS — I25.10 CORONARY ARTERY CALCIFICATION: Primary | ICD-10-CM

## 2021-10-20 DIAGNOSIS — I25.84 CORONARY ARTERY CALCIFICATION: Primary | ICD-10-CM

## 2021-10-20 RX ORDER — ATORVASTATIN CALCIUM 20 MG/1
20 TABLET, FILM COATED ORAL DAILY
Qty: 90 TABLET | Refills: 1 | Status: SHIPPED | OUTPATIENT
Start: 2021-10-20 | End: 2023-02-03

## 2021-10-20 RX ORDER — ASPIRIN 81 MG/1
81 TABLET ORAL DAILY
Qty: 90 TABLET | Refills: 3 | Status: SHIPPED | OUTPATIENT
Start: 2021-10-20 | End: 2023-02-03

## 2021-10-20 NOTE — PROGRESS NOTES
Stress test results discussed with patient; perfusion study with no evidence of ischemia.    Minimal coronary artery calcification noted, discussed with patient.  Will initiate ASA, atorvastatin 20 mg daily for coronary calcifications.  Receptive of plan.

## 2021-11-19 PROCEDURE — 93248 EXT ECG>7D<15D REV&INTERPJ: CPT | Performed by: INTERNAL MEDICINE

## 2021-11-23 ENCOUNTER — OFFICE VISIT (OUTPATIENT)
Dept: CARDIOLOGY | Facility: HOSPITAL | Age: 61
End: 2021-11-23

## 2021-11-23 VITALS
WEIGHT: 110.25 LBS | HEART RATE: 90 BPM | DIASTOLIC BLOOD PRESSURE: 70 MMHG | SYSTOLIC BLOOD PRESSURE: 120 MMHG | BODY MASS INDEX: 18.37 KG/M2 | TEMPERATURE: 97.1 F | OXYGEN SATURATION: 100 % | RESPIRATION RATE: 16 BRPM | HEIGHT: 65 IN

## 2021-11-23 DIAGNOSIS — I25.10 CORONARY ARTERY CALCIFICATION: ICD-10-CM

## 2021-11-23 DIAGNOSIS — I25.84 CORONARY ARTERY CALCIFICATION: ICD-10-CM

## 2021-11-23 DIAGNOSIS — R00.2 PALPITATIONS: ICD-10-CM

## 2021-11-23 DIAGNOSIS — R07.9 CHEST PAIN, UNSPECIFIED TYPE: Primary | ICD-10-CM

## 2021-11-23 DIAGNOSIS — R06.09 DOE (DYSPNEA ON EXERTION): ICD-10-CM

## 2021-11-23 PROCEDURE — 99214 OFFICE O/P EST MOD 30 MIN: CPT | Performed by: NURSE PRACTITIONER

## 2021-11-23 NOTE — PROGRESS NOTES
"Chief Complaint  Follow-up (monitor results)    Subjective    History of Present Illness {CC  Problem List  Visit  Diagnosis   Encounters  Notes  Medications  Labs  Result Review Imaging  Media :23}     Jessica Delgado, 61 y.o. female with mild coronary calcification on stress test, COPD, unknown arrhythmia requiring chemical cardioversion/ablation (approx 20 years ago) presents to [facility] for Follow-up (monitor results).    Patient with previous visit to heart and valve clinic for chest pain, MOHAMUD, and palpitations. TTE completed with normal LV systolic function, no significant valvular abnormalities. Myocardial perfusion test with no evidence of ischemia, minimal coronary calcification. Holter monitor interpretation of normal study.    Patient presents today with improvement in symptoms. She denies anginal symptoms, increased dyspnea, racing heart, and syncope. She still has intermittent MOHAMUD and occasional pleural pain with deep inspiration. She has previously followed with pulmonary, but no recent visits.  Reviewed cardiac testing completed, and also reviewed emergency department work-up with patient.  She has continued with smoking cessation.      Objective     Vital Signs:   Vitals:    11/23/21 0837   BP: 120/70   BP Location: Left arm   Patient Position: Sitting   Cuff Size: Adult   Pulse: 90   Resp: 16   Temp: 97.1 °F (36.2 °C)   TempSrc: Temporal   SpO2: 100%   Weight: 50 kg (110 lb 4 oz)   Height: 165.1 cm (65\")     Body mass index is 18.35 kg/m².  Physical Exam  Vitals and nursing note reviewed.   Constitutional:       Appearance: Normal appearance.   HENT:      Head: Normocephalic.   Eyes:      Extraocular Movements: Extraocular movements intact.   Neck:      Vascular: No carotid bruit.   Cardiovascular:      Rate and Rhythm: Normal rate and regular rhythm.      Pulses: Normal pulses.      Heart sounds: Normal heart sounds, S1 normal and S2 normal. No murmur " heard.      Pulmonary:      Effort: Pulmonary effort is normal. No respiratory distress.      Breath sounds: Normal breath sounds.   Musculoskeletal:      Cervical back: Neck supple.      Right lower leg: No edema.      Left lower leg: No edema.   Skin:     General: Skin is warm and dry.   Neurological:      General: No focal deficit present.      Mental Status: She is alert.   Psychiatric:         Mood and Affect: Mood normal.         Behavior: Behavior normal.         Thought Content: Thought content normal.       Data Reviewed:{ Labs  Result Review  Imaging  Med Tab  Media :23}     Adult Transthoracic Echo Complete W/ Cont if Necessary Per Protocol (10/19/2021 08:24)  Stress Test With Myocardial Perfusion (1 Day) (10/18/2021 09:18)  Holter Monitor - 72 Hour Up To 15 Days (10/13/2021 15:52)  ECG 12 Lead (10/01/2021 12:25)  Troponin (10/01/2021 12:24)  D-dimer, Quantitative (10/01/2021 10:10)  BNP (10/01/2021 10:10)  Lipase (10/01/2021 10:10)  Comprehensive Metabolic Panel (10/01/2021 10:10)  CBC & Differential (10/01/2021 10:10)  Troponin (10/01/2021 10:10)  CT Angiogram Chest (10/01/2021 14:17)        Assessment and Plan {CC Problem List  Visit Diagnosis  ROS  Review (Popup)  [facility] Maintenance  Quality  BestPractice  Medications  SmartSets  SnapShot Encounters  Media :23}     1. Chest pain, unspecified type  -Denies anginal symptoms  -Myocardial perfusion test with no evidence of ischemia, minimal coronary calcification  -Continue ASA, atorvastatin for coronary calcification on stress imaging    2. MOHAMUD (dyspnea on exertion)  -Improved, continues to have intermittent dyspnea. . Previously followed with pulmonary, no recent visit.   -TTE with normal LV systolic function, no significant valvular abnormalities  -Discussed follow-up with PCP for pleural symptoms, and possibly return to pulmonary    3. Coronary artery calcification  -Minimal coronary calcification on stress test  -Continue ASA,  atorvastatin.     4. Palpitations  -Improved, denies racing heart/syncope  -Holter monitor with overall normal study; low burden PAC, no significant arrhythmia.  -TTE/stress test as above      Follow Up {Instructions Charge Capture  Follow-up Communications :23}     Return if symptoms worsen or fail to improve.      Patient was given instructions and counseling regarding her condition or for health maintenance advice. Please see specific information pulled into the AVS if appropriate.  Patient was instructed to call the [facility] with any questions, concerns, or worsening symptoms.    Dictated Utilizing Dragon Dictation   Please note that portions of this note were completed with a voice recognition program.   Part of this note may be an electronic transcription/translation of spoken language to printed text using the Dragon Dictation System.

## 2022-03-22 ENCOUNTER — OFFICE VISIT (OUTPATIENT)
Dept: GASTROENTEROLOGY | Facility: CLINIC | Age: 62
End: 2022-03-22

## 2022-03-22 ENCOUNTER — LAB (OUTPATIENT)
Dept: LAB | Facility: HOSPITAL | Age: 62
End: 2022-03-22

## 2022-03-22 VITALS
DIASTOLIC BLOOD PRESSURE: 82 MMHG | BODY MASS INDEX: 18.73 KG/M2 | OXYGEN SATURATION: 98 % | HEIGHT: 65 IN | SYSTOLIC BLOOD PRESSURE: 130 MMHG | TEMPERATURE: 97.9 F | WEIGHT: 112.4 LBS | HEART RATE: 88 BPM

## 2022-03-22 DIAGNOSIS — K58.0 IRRITABLE BOWEL SYNDROME WITH DIARRHEA: ICD-10-CM

## 2022-03-22 DIAGNOSIS — K90.9 STEATORRHEA: ICD-10-CM

## 2022-03-22 DIAGNOSIS — K76.9 LIVER DISORDER: ICD-10-CM

## 2022-03-22 DIAGNOSIS — R10.10 UPPER ABDOMINAL PAIN: ICD-10-CM

## 2022-03-22 DIAGNOSIS — K21.9 GASTROESOPHAGEAL REFLUX DISEASE, UNSPECIFIED WHETHER ESOPHAGITIS PRESENT: Primary | ICD-10-CM

## 2022-03-22 PROBLEM — R10.9 FLANK PAIN: Status: ACTIVE | Noted: 2020-10-26

## 2022-03-22 PROBLEM — N39.46 MIXED STRESS AND URGE URINARY INCONTINENCE: Status: ACTIVE | Noted: 2020-10-26

## 2022-03-22 LAB
ALBUMIN SERPL-MCNC: 4.8 G/DL (ref 3.5–5.2)
ALBUMIN/GLOB SERPL: 1.9 G/DL
ALP SERPL-CCNC: 123 U/L (ref 39–117)
ALT SERPL W P-5'-P-CCNC: 19 U/L (ref 1–33)
ANION GAP SERPL CALCULATED.3IONS-SCNC: 10 MMOL/L (ref 5–15)
AST SERPL-CCNC: 24 U/L (ref 1–32)
BASOPHILS # BLD AUTO: 0.09 10*3/MM3 (ref 0–0.2)
BASOPHILS NFR BLD AUTO: 1 % (ref 0–1.5)
BILIRUB SERPL-MCNC: 0.2 MG/DL (ref 0–1.2)
BUN SERPL-MCNC: 12 MG/DL (ref 8–23)
BUN/CREAT SERPL: 15.2 (ref 7–25)
CALCIUM SPEC-SCNC: 9.4 MG/DL (ref 8.6–10.5)
CHLORIDE SERPL-SCNC: 101 MMOL/L (ref 98–107)
CO2 SERPL-SCNC: 30 MMOL/L (ref 22–29)
CREAT SERPL-MCNC: 0.79 MG/DL (ref 0.57–1)
DEPRECATED RDW RBC AUTO: 45 FL (ref 37–54)
EGFRCR SERPLBLD CKD-EPI 2021: 85.2 ML/MIN/1.73
EOSINOPHIL # BLD AUTO: 0.22 10*3/MM3 (ref 0–0.4)
EOSINOPHIL NFR BLD AUTO: 2.5 % (ref 0.3–6.2)
ERYTHROCYTE [DISTWIDTH] IN BLOOD BY AUTOMATED COUNT: 13.5 % (ref 12.3–15.4)
GLOBULIN UR ELPH-MCNC: 2.5 GM/DL
GLUCOSE SERPL-MCNC: 88 MG/DL (ref 65–99)
HCT VFR BLD AUTO: 40 % (ref 34–46.6)
HGB BLD-MCNC: 13.2 G/DL (ref 12–15.9)
IMM GRANULOCYTES # BLD AUTO: 0.01 10*3/MM3 (ref 0–0.05)
IMM GRANULOCYTES NFR BLD AUTO: 0.1 % (ref 0–0.5)
INR PPP: 0.96 (ref 0.85–1.16)
LYMPHOCYTES # BLD AUTO: 1.93 10*3/MM3 (ref 0.7–3.1)
LYMPHOCYTES NFR BLD AUTO: 22 % (ref 19.6–45.3)
MCH RBC QN AUTO: 30 PG (ref 26.6–33)
MCHC RBC AUTO-ENTMCNC: 33 G/DL (ref 31.5–35.7)
MCV RBC AUTO: 90.9 FL (ref 79–97)
MONOCYTES # BLD AUTO: 0.59 10*3/MM3 (ref 0.1–0.9)
MONOCYTES NFR BLD AUTO: 6.7 % (ref 5–12)
NEUTROPHILS NFR BLD AUTO: 5.94 10*3/MM3 (ref 1.7–7)
NEUTROPHILS NFR BLD AUTO: 67.7 % (ref 42.7–76)
NRBC BLD AUTO-RTO: 0 /100 WBC (ref 0–0.2)
PLATELET # BLD AUTO: 322 10*3/MM3 (ref 140–450)
PMV BLD AUTO: 11.4 FL (ref 6–12)
POTASSIUM SERPL-SCNC: 3.5 MMOL/L (ref 3.5–5.2)
PROT SERPL-MCNC: 7.3 G/DL (ref 6–8.5)
PROTHROMBIN TIME: 12.5 SECONDS (ref 11.4–14.4)
RBC # BLD AUTO: 4.4 10*6/MM3 (ref 3.77–5.28)
SODIUM SERPL-SCNC: 141 MMOL/L (ref 136–145)
WBC NRBC COR # BLD: 8.78 10*3/MM3 (ref 3.4–10.8)

## 2022-03-22 PROCEDURE — 86364 TISS TRNSGLTMNASE EA IG CLAS: CPT

## 2022-03-22 PROCEDURE — 86231 EMA EACH IG CLASS: CPT

## 2022-03-22 PROCEDURE — 86258 DGP ANTIBODY EACH IG CLASS: CPT

## 2022-03-22 PROCEDURE — 85610 PROTHROMBIN TIME: CPT

## 2022-03-22 PROCEDURE — 99215 OFFICE O/P EST HI 40 MIN: CPT | Performed by: NURSE PRACTITIONER

## 2022-03-22 PROCEDURE — 82784 ASSAY IGA/IGD/IGG/IGM EACH: CPT

## 2022-03-22 PROCEDURE — 36415 COLL VENOUS BLD VENIPUNCTURE: CPT

## 2022-03-22 PROCEDURE — 85025 COMPLETE CBC W/AUTO DIFF WBC: CPT

## 2022-03-22 PROCEDURE — 80053 COMPREHEN METABOLIC PANEL: CPT

## 2022-03-22 RX ORDER — FLUTICASONE PROPIONATE AND SALMETEROL XINAFOATE 115; 21 UG/1; UG/1
2 AEROSOL, METERED RESPIRATORY (INHALATION) 2 TIMES DAILY
COMMUNITY
End: 2023-02-03

## 2022-03-22 RX ORDER — FAMOTIDINE 40 MG/1
40 TABLET, FILM COATED ORAL DAILY
Qty: 30 TABLET | Refills: 2 | Status: SHIPPED | OUTPATIENT
Start: 2022-03-22 | End: 2023-02-03

## 2022-03-22 NOTE — PROGRESS NOTES
"     New Patient Consultation     Patient Name: Jessica Delgado  : 1960   MRN: [id number]     Chief Complaint:    Chief Complaint   Patient presents with   • Abdominal Pain       History of Present Illness: Jessica Delgado is a 61 y.o. female who is here today for a Gastroenterology Consultation for abdominal pain.    Jessica is here today with recurrent bloating and RUQ pain. This has been ongoing for a few years. The pain is constant but worse with eating. Was previously followed by Dr. Montelongo and had what sounds like an ERCP and biliary stenting. She reports her pain and bloating resolved but but returned after the stent was removed. She prefers to be seen at [facility].   She recalls having a scan of the abdomen with Dr. Montelongo (CT scan?).   Reports steatorrhea with urgency. About 2-3 times a day. Was prescribed colestipol but reports this did not work.  There is no unintentional weight loss. She denies dysphagia.  She does have heartburn daily. She takes tums for this.    There is no hx of pancreatitis.  No history of hepatitis.  Had a liver biopsy with Dr. Cowan- she does not know why she had a liver biopsy- was told it was \"fine\".   Her last colonoscopy was about 2 years ago with Dr. Cannon.   Has had zackery.  Hx of cervical ,ovarian, uterine cancer- had hysterectomy in her 20's.  Does not follow with an oncologist or GYN.  There is no vaginal bleeding.  Subjective      Review of Systems:   Review of Systems   Constitutional: Negative for appetite change and unexpected weight loss.   HENT: Negative for trouble swallowing.    Gastrointestinal: Positive for abdominal distention, abdominal pain, diarrhea, nausea, GERD and indigestion. Negative for anal bleeding, blood in stool, constipation, rectal pain and vomiting.       Past Medical History:   Past Medical History:   Diagnosis Date   • Cervical cancer (HCC)    • COPD (chronic obstructive pulmonary disease) (HCC)    • GERD (gastroesophageal reflux disease)  "   • History of nephrolithiasis    • IBS (irritable bowel syndrome)    • Ovarian cancer (HCC)    • Uterine cancer (HCC)        Past Surgical History:   Past Surgical History:   Procedure Laterality Date   • ABLATION OF DYSRHYTHMIC FOCUS     • CARDIAC CATHETERIZATION N/A 2017    Procedure: Left Heart Cath;  Surgeon: Travis Santoyo MD;  Location: [facility] CATH INVASIVE LOCATION;  Service:    •  SECTION      x 3   • CHOLECYSTECTOMY     • HYSTERECTOMY         Family History:   Family History   Problem Relation Age of Onset   • Alzheimer's disease Mother    • Cancer Father    • Lung cancer Father    • Hypertension Sister    • No Known Problems Brother    • No Known Problems Maternal Grandmother    • No Known Problems Maternal Grandfather    • No Known Problems Paternal Grandmother    • No Known Problems Paternal Grandfather    • Coronary artery disease Neg Hx        Social History:   Social History     Socioeconomic History   • Marital status:    Tobacco Use   • Smoking status: Former Smoker     Packs/day: 0.50     Years: 30.00     Pack years: 15.00     Types: Cigarettes   • Smokeless tobacco: Never Used   • Tobacco comment: 5-6 cigarettes/day. states last day of smoking was yesterday   Substance and Sexual Activity   • Alcohol use: Yes     Comment: social   • Drug use: No   • Sexual activity: Defer       Alcohol/Tobacco History:   Social History     Substance and Sexual Activity   Alcohol Use Yes    Comment: social     Social History     Tobacco Use   Smoking Status Former Smoker   • Packs/day: 0.50   • Years: 30.00   • Pack years: 15.00   • Types: Cigarettes   Smokeless Tobacco Never Used   Tobacco Comment    5-6 cigarettes/day. states last day of smoking was yesterday       Medications:     Current Outpatient Medications:   •  albuterol sulfate  (90 Base) MCG/ACT inhaler, Inhale 2 puffs Every 4 (Four) Hours As Needed for Wheezing or Shortness of Air., Disp: 1 g, Rfl: 0  •  aspirin 81 MG EC  "tablet, Take 1 tablet by mouth Daily., Disp: 90 tablet, Rfl: 3  •  atorvastatin (LIPITOR) 20 MG tablet, Take 1 tablet by mouth Daily., Disp: 90 tablet, Rfl: 1  •  fluticasone-salmeterol (Advair HFA) 115-21 MCG/ACT inhaler, Inhale 2 puffs 2 (Two) Times a Day., Disp: , Rfl:   •  famotidine (Pepcid) 40 MG tablet, Take 1 tablet by mouth Daily., Disp: 30 tablet, Rfl: 2  •  riFAXIMin (XIFAXAN) 550 MG tablet, Take 1 tablet by mouth 3 (Three) Times a Day., Disp: 42 tablet, Rfl: 0    Allergies:   No Known Allergies    Objective     Physical Exam:  Vital Signs:   Vitals:    03/22/22 1332   BP: 130/82   BP Location: Left arm   Patient Position: Sitting   Cuff Size: Adult   Pulse: 88   Temp: 97.9 °F (36.6 °C)   TempSrc: Temporal   SpO2: 98%   Weight: 51 kg (112 lb 6.4 oz)   Height: 165.1 cm (65\")   PainSc:   8     Body mass index is 18.7 kg/m².     Physical Exam  Vitals and nursing note reviewed.   Constitutional:       General: She is not in acute distress.     Appearance: She is well-developed. She is not diaphoretic.   Eyes:      General: No scleral icterus.     Conjunctiva/sclera: Conjunctivae normal.   Neck:      Thyroid: No thyromegaly.   Cardiovascular:      Rate and Rhythm: Normal rate and regular rhythm.   Pulmonary:      Effort: Pulmonary effort is normal.      Comments: Decreased breath sounds throughout   Abdominal:      General: Bowel sounds are normal. There is distension.      Palpations: Abdomen is soft.      Tenderness: There is abdominal tenderness in the epigastric area. There is no guarding or rebound.      Hernia: No hernia is present.   Musculoskeletal:      Cervical back: Neck supple.      Right lower leg: No edema.      Left lower leg: No edema.   Skin:     General: Skin is warm and dry.      Capillary Refill: Capillary refill takes 2 to 3 seconds.      Coloration: Skin is not jaundiced or pale.      Findings: No bruising or petechiae.      Nails: There is no clubbing.   Neurological:      Mental Status: " She is alert and oriented to person, place, and time.   Psychiatric:         Behavior: Behavior normal.         Thought Content: Thought content normal.         Judgment: Judgment normal.         Assessment / Plan      Assessment/Plan:   Diagnoses and all orders for this visit:    1. Gastroesophageal reflux disease, unspecified whether esophagitis present (Primary)  -     famotidine (Pepcid) 40 MG tablet; Take 1 tablet by mouth Daily.  Dispense: 30 tablet; Refill: 2    2. Upper abdominal pain    3. Liver disorder  -     Protime-INR; Future  -     Comprehensive Metabolic Panel; Future  -     CBC & Differential; Future    4. Steatorrhea  -     pH, Stool - Stool, Per Rectum; Future  -     Pancreatic Elastase, Fecal - Stool, Per Rectum; Future  -     Gastrointestinal Panel, PCR - Stool, Per Rectum; Future  -     Ova & Parasite Examination - Stool, Per Rectum; Future  -     Celiac Comprehensive Panel; Future    5. Irritable bowel syndrome with diarrhea  -     riFAXIMin (XIFAXAN) 550 MG tablet; Take 1 tablet by mouth 3 (Three) Times a Day.  Dispense: 42 tablet; Refill: 0       Will request records from Dr. Montelongo (ERCP and colonoscopy + office notes) and Dr. Cowan's office (liver biopsy?)  Will obtain stool tests and lab work.  Will start Pepcid for GERD and lifestyle measures for GERD discussed.  Will trial Xifaxan for her diarrhea and have her follow-up in 6 weeks.  Follow Up:   Return in about 6 weeks (around 5/3/2022), or if symptoms worsen or fail to improve.    Plan of care reviewed with the patient at the conclusion of today's visit.  Education was provided regarding diagnosis, management, and any prescribed or recommended OTC medications.  Patient verbalized understanding of and agreement with management plan.     Time Statement:   Discussed plan of care in detail with patient today. Patient verbally understands and agrees. I have spent 40minutes reviewing available diagnostics, obtaining history, examining the  patient, developing a treatment plan, and educating the patient on disease process and plan of care.    MARIUSZ Douglas  [facility]

## 2022-03-23 LAB
ENDOMYSIUM IGA SER QL: NEGATIVE
GLIADIN PEPTIDE IGA SER-ACNC: 6 UNITS (ref 0–19)
GLIADIN PEPTIDE IGG SER-ACNC: 5 UNITS (ref 0–19)
IGA SERPL-MCNC: 158 MG/DL (ref 87–352)
TTG IGA SER-ACNC: <2 U/ML (ref 0–3)
TTG IGG SER-ACNC: <2 U/ML (ref 0–5)

## 2022-03-28 ENCOUNTER — TELEPHONE (OUTPATIENT)
Dept: GASTROENTEROLOGY | Facility: CLINIC | Age: 62
End: 2022-03-28

## 2022-03-29 ENCOUNTER — LAB (OUTPATIENT)
Dept: LAB | Facility: HOSPITAL | Age: 62
End: 2022-03-29

## 2022-03-29 DIAGNOSIS — K90.9 STEATORRHEA: ICD-10-CM

## 2022-03-29 LAB

## 2022-03-29 PROCEDURE — 0097U HC BIOFIRE FILMARRAY GI PANEL: CPT

## 2022-03-30 ENCOUNTER — TELEPHONE (OUTPATIENT)
Dept: GASTROENTEROLOGY | Facility: CLINIC | Age: 62
End: 2022-03-30

## 2022-03-30 NOTE — TELEPHONE ENCOUNTER
Can you please let her know that we reviewed her records from [facility].  Based on the fact that Dr. Noriega has done a sphincterotomy on her, we recommend that she return back to [facility] for continued management.  We will not be able to perform any non emergent ERCPs on her unfortunately at this clinic.

## 2022-04-04 ENCOUNTER — APPOINTMENT (OUTPATIENT)
Dept: CT IMAGING | Facility: HOSPITAL | Age: 62
End: 2022-04-04

## 2022-04-15 ENCOUNTER — APPOINTMENT (OUTPATIENT)
Dept: CT IMAGING | Facility: HOSPITAL | Age: 62
End: 2022-04-15

## 2022-04-28 ENCOUNTER — LAB REQUISITION (OUTPATIENT)
Dept: LAB | Facility: HOSPITAL | Age: 62
End: 2022-04-28

## 2022-04-28 DIAGNOSIS — R22.32 LOCALIZED SWELLING, MASS AND LUMP, LEFT UPPER LIMB: ICD-10-CM

## 2022-04-28 PROCEDURE — 88304 TISSUE EXAM BY PATHOLOGIST: CPT | Performed by: PLASTIC SURGERY

## 2022-04-29 LAB
CYTO UR: NORMAL
LAB AP CASE REPORT: NORMAL
LAB AP CLINICAL INFORMATION: NORMAL
PATH REPORT.FINAL DX SPEC: NORMAL
PATH REPORT.GROSS SPEC: NORMAL

## 2022-05-10 ENCOUNTER — TREATMENT (OUTPATIENT)
Dept: PHYSICAL THERAPY | Facility: CLINIC | Age: 62
End: 2022-05-10

## 2022-05-10 ENCOUNTER — TRANSCRIBE ORDERS (OUTPATIENT)
Dept: PHYSICAL THERAPY | Facility: CLINIC | Age: 62
End: 2022-05-10

## 2022-05-10 DIAGNOSIS — M25.532 LEFT WRIST PAIN: ICD-10-CM

## 2022-05-10 DIAGNOSIS — Z47.89 ORTHOPEDIC AFTERCARE: ICD-10-CM

## 2022-05-10 DIAGNOSIS — M67.432 GANGLION CYST OF DORSUM OF LEFT WRIST: Primary | ICD-10-CM

## 2022-05-10 DIAGNOSIS — Z98.890 S/P EXCISION OF GANGLION CYST: Primary | ICD-10-CM

## 2022-05-10 PROCEDURE — L3808 WHFO, RIGID W/O JOINTS: HCPCS | Performed by: PHYSICAL THERAPIST

## 2022-05-11 NOTE — PROGRESS NOTES
Jessica AMARIS Sandy 1960   Diagnosis/ Surgery: Left dorsal wrist Ganglion cyst, S/P excision.             Date Of Onset: 1 year    Date Of Surgery:4/28/2022    Hand Dominance: Right  History of Present Condition: 1 year or more of a ganglion cyst that was painful and not responding to conservative treatment.  S/P excision of cyst, sent to PT for post operative splinting.   Medical/Vocational History/ Medications: PMH see Epic. Works in .    Pain: 6/10 dorsal wrist    Edema: moderate left dorsal wrist.  Sensibility: WNL   Wound Status:Dorsal wrist, healing well.  ROM/ Strength/Test: Not assessed due to surgical precautions    Splinting:  · Patient was measure and fit with a custom fabricated forearm based volar wrist/hand immobilization splint.   · Patient was instructed in wearing schedule, precautions and care of the splint during this visit.   · Patient was instructed in proper donning/doffing of splint.   Assessment:  · Patient was fitted and appropriate splint was fabricated this date.  · Patient reported that splint was comfortable and had no complications with the fit of the splint.  · Patient was instructed and patient verbalized understanding of precautions, wear and care of the splint.   · Patient demonstrated independent donning/doffing of splint during treatment today.  Goals:  · Patient was fitted properly with appropriate splint for diagnosis  · Patient was educated on precautions, wear schedule and care of splint  · Patient demonstrated independence with donning/doffing of the splint.  · Splint was provided to Protect Healing Structures, Restrict Mobility, Improve joint alignment.  Plan:  · No additional treatment is required for this patient at this time. The patient is therefore discharged from therapy.  · Patient advised to contact therapist with any additional questions or concerns regarding the fit and function of the splint.  · Patient will be seen for splint issues as needed   · Wear  Instructions: Off for hygiene       PT SIGNATURE: Celestino Garcia PT, CHT  License Number: [id number]  Electronically signed by Celestino Garcia PT, 05/11/22, 1:18 PM EDT

## 2023-02-03 ENCOUNTER — OFFICE VISIT (OUTPATIENT)
Dept: PULMONOLOGY | Facility: CLINIC | Age: 63
End: 2023-02-03
Payer: COMMERCIAL

## 2023-02-03 VITALS
SYSTOLIC BLOOD PRESSURE: 120 MMHG | DIASTOLIC BLOOD PRESSURE: 72 MMHG | OXYGEN SATURATION: 98 % | WEIGHT: 117 LBS | HEIGHT: 65 IN | HEART RATE: 85 BPM | BODY MASS INDEX: 19.49 KG/M2 | TEMPERATURE: 97.3 F

## 2023-02-03 DIAGNOSIS — K21.9 GERD WITHOUT ESOPHAGITIS: ICD-10-CM

## 2023-02-03 DIAGNOSIS — Z72.0 TOBACCO ABUSE: ICD-10-CM

## 2023-02-03 DIAGNOSIS — F17.210 CIGARETTE NICOTINE DEPENDENCE WITHOUT COMPLICATION: ICD-10-CM

## 2023-02-03 DIAGNOSIS — J41.1 MUCOPURULENT CHRONIC BRONCHITIS: Primary | ICD-10-CM

## 2023-02-03 PROCEDURE — 94729 DIFFUSING CAPACITY: CPT | Performed by: NURSE PRACTITIONER

## 2023-02-03 PROCEDURE — 99214 OFFICE O/P EST MOD 30 MIN: CPT | Performed by: NURSE PRACTITIONER

## 2023-02-03 PROCEDURE — 94060 EVALUATION OF WHEEZING: CPT | Performed by: NURSE PRACTITIONER

## 2023-02-03 PROCEDURE — 94726 PLETHYSMOGRAPHY LUNG VOLUMES: CPT | Performed by: NURSE PRACTITIONER

## 2023-02-03 RX ORDER — ALBUTEROL SULFATE 90 UG/1
4 AEROSOL, METERED RESPIRATORY (INHALATION) ONCE
Status: COMPLETED | OUTPATIENT
Start: 2023-02-03 | End: 2023-02-03

## 2023-02-03 RX ORDER — FLUTICASONE PROPIONATE AND SALMETEROL XINAFOATE 230; 21 UG/1; UG/1
2 AEROSOL, METERED RESPIRATORY (INHALATION)
Qty: 12 G | Refills: 11 | Status: SHIPPED | OUTPATIENT
Start: 2023-02-03

## 2023-02-03 RX ORDER — OMEPRAZOLE 40 MG/1
40 CAPSULE, DELAYED RELEASE ORAL DAILY
Qty: 90 CAPSULE | Refills: 3 | Status: SHIPPED | OUTPATIENT
Start: 2023-02-03

## 2023-02-03 RX ORDER — PREDNISONE 10 MG/1
TABLET ORAL
Qty: 31 TABLET | Refills: 0 | Status: SHIPPED | OUTPATIENT
Start: 2023-02-03 | End: 2023-04-07

## 2023-02-03 RX ORDER — AMOXICILLIN AND CLAVULANATE POTASSIUM 875; 125 MG/1; MG/1
1 TABLET, FILM COATED ORAL 2 TIMES DAILY
Qty: 20 TABLET | Refills: 0 | Status: SHIPPED | OUTPATIENT
Start: 2023-02-03 | End: 2023-04-07

## 2023-02-03 RX ADMIN — ALBUTEROL SULFATE 4 PUFF: 90 AEROSOL, METERED RESPIRATORY (INHALATION) at 11:23

## 2023-02-03 NOTE — PROGRESS NOTES
[facility] Pulmonary Follow up    CHIEF COMPLAINT    Dyspnea with exertion    HISTORY OF PRESENT ILLNESS    Jessica Delgado is a 62 y.o.female here today for follow-up.  She was last seen in the office by Dr. Santacruz in April 2021.  She denies any respiratory illnesses since her last appointment.  She continues to use her Advair at least daily.  She has albuterol to use occasionally for shortness of breath.    She states she has noticed more sinus drainage and some congestion.  She feels like she may be doing a cold.    She states that she is coughing up some light tan secretions.  She denies any hemoptysis.  She denies any chest pain or palpitations.  She denies any lower extremity edema or calf tenderness.    She is currently smoking 2 cigarettes/day.  She has a 30-pack-year history.  She has not had a low-dose CT screening since October 2021.     She has noticed more reflux symptoms lately.  She has not been taking anything on a regular basis for this.    Patient Active Problem List   Diagnosis   • Mucopurulent chronic bronchitis (HCC)   • Multiple pulmonary nodules   • Cigarette nicotine dependence without complication   • Flank pain   • Mixed stress and urge urinary incontinence       No Known Allergies    Current Outpatient Medications:   •  albuterol sulfate  (90 Base) MCG/ACT inhaler, Inhale 2 puffs Every 4 (Four) Hours As Needed for Wheezing or Shortness of Air., Disp: 1 g, Rfl: 0  •  amoxicillin-clavulanate (Augmentin) 875-125 MG per tablet, Take 1 tablet by mouth 2 (Two) Times a Day., Disp: 20 tablet, Rfl: 0  •  fluticasone-salmeterol (Advair HFA) 230-21 MCG/ACT inhaler, Inhale 2 puffs 2 (Two) Times a Day., Disp: 12 g, Rfl: 11  •  omeprazole (priLOSEC) 40 MG capsule, Take 1 capsule by mouth Daily., Disp: 90 capsule, Rfl: 3  •  predniSONE (DELTASONE) 10 MG tablet, Take 4 tabs daily x 3 days, then take 3 tabs daily x 3 days, then take 2 tabs daily x 3 days, then take 1 tab daily x 3 days, Disp: 31  "tablet, Rfl: 0  No current facility-administered medications for this visit.  MEDICATION LIST AND ALLERGIES REVIEWED.    Social History     Tobacco Use   • Smoking status: Former     Packs/day: 1.00     Years: 30.00     Pack years: 30.00     Types: Cigarettes     Start date: 1973     Quit date: 2022     Years since quittin.5   • Smokeless tobacco: Never   • Tobacco comments:     5-6 cigarettes/day. states last day of smoking was yesterday   Substance Use Topics   • Alcohol use: Yes     Comment: social   • Drug use: No       FAMILY AND SOCIAL HISTORY REVIEWED.    Review of Systems   Constitutional: Negative for activity change, appetite change, fatigue, fever and unexpected weight change.   HENT: Positive for congestion, postnasal drip and rhinorrhea. Negative for sinus pressure, sore throat and voice change.    Eyes: Negative for visual disturbance.   Respiratory: Positive for cough, chest tightness and shortness of breath. Negative for wheezing.    Cardiovascular: Negative for chest pain, palpitations and leg swelling.   Gastrointestinal: Negative for abdominal distention, abdominal pain, nausea and vomiting.   Endocrine: Negative for cold intolerance and heat intolerance.   Genitourinary: Negative for difficulty urinating and urgency.   Musculoskeletal: Negative for arthralgias, back pain and neck pain.   Skin: Negative for color change and pallor.   Allergic/Immunologic: Negative for environmental allergies and food allergies.   Neurological: Negative for dizziness, syncope, weakness and light-headedness.   Hematological: Negative for adenopathy. Does not bruise/bleed easily.   Psychiatric/Behavioral: Negative for agitation and behavioral problems.   .    /72 (BP Location: Right arm, Patient Position: Sitting, Cuff Size: Adult)   Pulse 85   Temp 97.3 °F (36.3 °C) (Infrared)   Ht 165.1 cm (65\")   Wt 53.1 kg (117 lb)   SpO2 98% Comment: resting room air  BMI 19.47 kg/m²       There is no " immunization history on file for this patient.    Physical Exam  Vitals and nursing note reviewed.   Constitutional:       Appearance: She is well-developed. She is not diaphoretic.   HENT:      Head: Normocephalic and atraumatic.   Eyes:      Pupils: Pupils are equal, round, and reactive to light.   Neck:      Thyroid: No thyromegaly.   Cardiovascular:      Rate and Rhythm: Normal rate and regular rhythm.      Heart sounds: Normal heart sounds. No murmur heard.    No friction rub. No gallop.   Pulmonary:      Effort: Pulmonary effort is normal. No respiratory distress.      Breath sounds: Normal breath sounds. No wheezing or rales.   Chest:      Chest wall: No tenderness.   Abdominal:      General: Bowel sounds are normal.      Palpations: Abdomen is soft.      Tenderness: There is no abdominal tenderness.   Musculoskeletal:         General: No swelling. Normal range of motion.      Cervical back: Normal range of motion and neck supple.   Lymphadenopathy:      Cervical: No cervical adenopathy.   Skin:     General: Skin is warm and dry.      Capillary Refill: Capillary refill takes less than 2 seconds.   Neurological:      Mental Status: She is alert and oriented to person, place, and time.   Psychiatric:         Mood and Affect: Mood normal.         Behavior: Behavior normal.           RESULTS    PFTS in the office today, read by me, FVC 2.79 83% predicted, FEV1 1.74 67% predicted, FEV1/FVC 62% predicted, TLC 4.71 93% predicted, DLCO 74% predicted, mild obstruction with no postbronchodilator response, no restriction and reduced diffusion.    Chest PA/lateral: Official report is pending.    PROBLEM LIST    Problem List Items Addressed This Visit        Pulmonary and Pneumonias    Mucopurulent chronic bronchitis (HCC) - Primary    Relevant Medications    fluticasone-salmeterol (Advair HFA) 230-21 MCG/ACT inhaler    omeprazole (priLOSEC) 40 MG capsule    amoxicillin-clavulanate (Augmentin) 875-125 MG per tablet     predniSONE (DELTASONE) 10 MG tablet    albuterol sulfate HFA (PROVENTIL HFA;VENTOLIN HFA;PROAIR HFA) inhaler 4 puff (Completed)    Other Relevant Orders    XR Chest PA & Lateral    Pulmonary Function Test (Completed)       Tobacco    Cigarette nicotine dependence without complication    Relevant Orders    CT chest low dose wo   Other Visit Diagnoses     GERD without esophagitis        Relevant Medications    omeprazole (priLOSEC) 40 MG capsule            DISCUSSION    Ms. Delgado was here today for follow-up of her emphysema.  She seems to doing fairly well from pulmonary standpoint.  I will send in Advair inhaler for her to use twice a day.  She had only been using it daily.  She will continue to use the albuterol as needed for shortness of breath.    We did review her full PFTs in the office today she continues mild obstruction.    We also reviewed her chest x-ray in the office today and official report is pending.  I will notify her of any abnormalities.    We did discuss smoking cessation in the office for 3 minutes.  She has not a stop date but is trying to quit.  She is also vaping in between her cigarettes.  She is due for a low-dose CT screening and I have placed the orders today.  I will contact her once I receive her results.    I am going to start her on omeprazole daily for GERD.  Labs discussed reflux precautions in the office today    She will follow-up in 1 year or sooner if her symptoms worsen.  Advised her to call with any additional concerns or questions.  I personally spent a total of 31 minutes on patient visit today including chart review, face to face with the patient obtaining the history and physical exam, review of pertinent images and tests, counseling and discussion and/or coordination of care as described above, and documentation.  Total time excludes time spent on other separate services such as performing procedures or test interpretation, if applicable.        Isis Sloan,  APRN  02/03/202313:43 EST  Electronically signed     Please note that portions of this note were completed with a voice recognition program.        CC: Bisi Domínguez MD

## 2023-02-17 ENCOUNTER — HOSPITAL ENCOUNTER (OUTPATIENT)
Dept: CT IMAGING | Facility: HOSPITAL | Age: 63
Discharge: HOME | End: 2023-02-17
Admitting: NURSE PRACTITIONER
Payer: COMMERCIAL

## 2023-02-17 DIAGNOSIS — F17.210 CIGARETTE NICOTINE DEPENDENCE WITHOUT COMPLICATION: ICD-10-CM

## 2023-02-17 PROCEDURE — 71271 CT THORAX LUNG CANCER SCR C-: CPT

## 2023-04-07 ENCOUNTER — TELEPHONE (OUTPATIENT)
Dept: GASTROENTEROLOGY | Facility: CLINIC | Age: 63
End: 2023-04-07

## 2023-04-07 NOTE — TELEPHONE ENCOUNTER
Patient called and notified the her appt today will be cancelled. She was previously been seen by MARIUSZ Douglas in this practice 3/2022. It was recommended at last visit that she return to Dr. Noriega at [facility] for continued management, any potential ERCP in the future. Pt states that she no longer wishes to follow with Dr. Noriega. She no longer wishes to Dr. Cowan. She does not wish to see MARIUSZ Douglas either.    Discussed with Yoselin, , who is out of office today. She will review with Dr. Reyes when they return to office on 4/11 and reach out to patient with next steps. All questions answered, pt is agreeable to this plan.

## (undated) DEVICE — CATH DIAG EXPO M/ PK 6FR FL4/FR4 PIG 3PK

## (undated) DEVICE — CANNULA,ADULT,SOFT-TOUCH,7'TUBE,UC: Brand: PENDING

## (undated) DEVICE — GW INQWIRE FC PTFE STD J/1.5 .035 260

## (undated) DEVICE — CATH DIAG EXPO .056 FL3.5 6F 100CM

## (undated) DEVICE — PK CATH CARD 10

## (undated) DEVICE — MODEL AT P54, P/N 700608-035KIT CONTENTS: HAND CONTROLLER, 3-WAY HIGH-PRESSURE STOPCOCK WITH ROTATING END AND PREMIUM HIGH-PRESSURE TUBING: Brand: ANGIOTOUCH® KIT

## (undated) DEVICE — GLIDESHEATH SLENDER STAINLESS STEEL KIT: Brand: GLIDESHEATH SLENDER

## (undated) DEVICE — TR BAND RADIAL ARTERY COMPRESSION DEVICE: Brand: TR BAND

## (undated) DEVICE — A2000 MULTI-USE SYRINGE KIT, P/N 701277-003KIT CONTENTS: 100ML CONTRAST RESERVOIR AND TUBING WITH CONTRAST SPIKE AND CLAMP: Brand: A2000 MULTI-USE SYRINGE KIT

## (undated) DEVICE — MODEL BT2000 P/N 700287-012KIT CONTENTS: MANIFOLD WITH SALINE AND CONTRAST PORTS, SALINE TUBING WITH SPIKE AND HAND SYRINGE, TRANSDUCER: Brand: BT2000 AUTOMATED MANIFOLD KIT